# Patient Record
Sex: MALE | Race: WHITE
[De-identification: names, ages, dates, MRNs, and addresses within clinical notes are randomized per-mention and may not be internally consistent; named-entity substitution may affect disease eponyms.]

---

## 2020-03-04 ENCOUNTER — HOSPITAL ENCOUNTER (INPATIENT)
Dept: HOSPITAL 12 - ER | Age: 65
LOS: 13 days | Discharge: NURSING, REHAB OR LONG-TERM CARE | DRG: 885 | End: 2020-03-17
Payer: MEDICARE

## 2020-03-04 VITALS — HEIGHT: 69 IN | WEIGHT: 166 LBS | BODY MASS INDEX: 24.59 KG/M2

## 2020-03-04 DIAGNOSIS — G30.0: ICD-10-CM

## 2020-03-04 DIAGNOSIS — M62.81: ICD-10-CM

## 2020-03-04 DIAGNOSIS — F02.80: ICD-10-CM

## 2020-03-04 DIAGNOSIS — F41.9: ICD-10-CM

## 2020-03-04 DIAGNOSIS — R26.81: ICD-10-CM

## 2020-03-04 DIAGNOSIS — E78.5: ICD-10-CM

## 2020-03-04 DIAGNOSIS — D68.59: ICD-10-CM

## 2020-03-04 DIAGNOSIS — Z91.81: ICD-10-CM

## 2020-03-04 DIAGNOSIS — F39: Primary | ICD-10-CM

## 2020-03-04 DIAGNOSIS — Z74.09: ICD-10-CM

## 2020-03-04 LAB
BASOPHILS # BLD AUTO: 0 K/UL (ref 0–8)
BASOPHILS NFR BLD AUTO: 0.5 % (ref 0–2)
BUN SERPL-MCNC: 15 MG/DL (ref 7–18)
CHLORIDE SERPL-SCNC: 106 MMOL/L (ref 98–107)
CO2 SERPL-SCNC: 28 MMOL/L (ref 21–32)
CREAT SERPL-MCNC: 0.9 MG/DL (ref 0.6–1.3)
EOSINOPHIL # BLD AUTO: 0.1 K/UL (ref 0–0.7)
EOSINOPHIL NFR BLD AUTO: 0.8 % (ref 0–7)
GLUCOSE SERPL-MCNC: 95 MG/DL (ref 74–106)
HCT VFR BLD AUTO: 44.9 % (ref 36.7–47.1)
HGB BLD-MCNC: 15 G/DL (ref 12.5–16.3)
LYMPHOCYTES # BLD AUTO: 1.3 K/UL (ref 20–40)
LYMPHOCYTES NFR BLD AUTO: 17.7 % (ref 20.5–51.5)
MCH RBC QN AUTO: 31.3 UUG (ref 23.8–33.4)
MCHC RBC AUTO-ENTMCNC: 33 G/DL (ref 32.5–36.3)
MCV RBC AUTO: 93.6 FL (ref 73–96.2)
MONOCYTES # BLD AUTO: 0.6 K/UL (ref 2–10)
MONOCYTES NFR BLD AUTO: 8.5 % (ref 0–11)
NEUTROPHILS # BLD AUTO: 5.4 K/UL (ref 1.8–8.9)
NEUTROPHILS NFR BLD AUTO: 72.5 % (ref 38.5–71.5)
PLATELET # BLD AUTO: 234 K/UL (ref 152–348)
POTASSIUM SERPL-SCNC: 3.5 MMOL/L (ref 3.5–5.1)
RBC # BLD AUTO: 4.79 MIL/UL (ref 4.06–5.63)
TSH SERPL DL<=0.005 MIU/L-ACNC: 8.7 MIU/ML (ref 0.36–3.74)
WBC # BLD AUTO: 7.4 K/UL (ref 3.6–10.2)
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
## 2020-03-05 VITALS — DIASTOLIC BLOOD PRESSURE: 66 MMHG | SYSTOLIC BLOOD PRESSURE: 131 MMHG

## 2020-03-05 VITALS — SYSTOLIC BLOOD PRESSURE: 127 MMHG | DIASTOLIC BLOOD PRESSURE: 73 MMHG

## 2020-03-05 VITALS — DIASTOLIC BLOOD PRESSURE: 62 MMHG | SYSTOLIC BLOOD PRESSURE: 128 MMHG

## 2020-03-05 VITALS — SYSTOLIC BLOOD PRESSURE: 116 MMHG | DIASTOLIC BLOOD PRESSURE: 55 MMHG

## 2020-03-05 LAB
CHOLEST SERPL-MCNC: 188 MG/DL (ref ?–200)
HDLC SERPL-MCNC: 35 MG/DL (ref 40–60)
TRIGL SERPL-MCNC: 146 MG/DL (ref 30–150)

## 2020-03-05 RX ADMIN — ACETAMINOPHEN PRN MG: 325 TABLET ORAL at 14:41

## 2020-03-05 RX ADMIN — CLONAZEPAM PRN MG: 0.5 TABLET ORAL at 09:40

## 2020-03-05 RX ADMIN — CLONAZEPAM PRN MG: 0.5 TABLET ORAL at 20:35

## 2020-03-05 RX ADMIN — CLONAZEPAM PRN MG: 0.5 TABLET ORAL at 14:41

## 2020-03-05 RX ADMIN — DOCUSATE SODIUM - SENNOSIDES SCH TAB: 50; 8.6 TABLET, FILM COATED ORAL at 21:30

## 2020-03-05 NOTE — NUR
Social Work Coordination of Care:  spoke to wellness coordination Taina 
([phone]) who stated that she is unsure if they will accept patient, but will get back 
to this writer with an answer. Per Taina, she stated that Nancy the director will contact 
this writer.

## 2020-03-05 NOTE — NUR
Received patient asleep with 1:1 sitter for safety. Patient shows no signs or symptoms of 
distress at this time. No signs or pain. Will continue to monitor.

## 2020-03-05 NOTE — NUR
Received patient in stable condition accompanied by RN. Received report from HEBERT Hernandez. 
Patient is alert and oriented to his name only. Oriented to the mental health unit, today's 
date, and time. Patient admitted on a 5150 hold for GD for increased agitation and 
unmanageable behavior from board and care. Patient's belongings removed and placed in 
contraband locker.

## 2020-03-05 NOTE — NUR
coping skills assessed; pt unable to evaluate due to AMS

-------------------------------------------------------------------------------

Addendum: 03/05/20 at 0302 by GREG LINO RN

-------------------------------------------------------------------------------

Amended: Links added.

## 2020-03-05 NOTE — NUR
Social Work Initial Discharge Plan: [place] [address];[phone]. This writer spoke with Nancy ([phone]) 
who stated that she is unsure if they will accept patient, but will get back to this writer 
with an answer. Per patient's wife Justyn ([phone]) stated that she would want patient 
back there.  will work with the MD regarding appropriate discharge planning. 
 will form a safe and proper discharge.

## 2020-03-05 NOTE — NUR
Social Work Family Contact:  contacted patient's wife Justyn ([phone]) to 
gather collateral. This writer discussed treatment plan and discharge plan. PerJustyn she is 
the DPOA and will send this writer the documentation.

## 2020-03-06 VITALS — SYSTOLIC BLOOD PRESSURE: 116 MMHG | DIASTOLIC BLOOD PRESSURE: 74 MMHG

## 2020-03-06 VITALS — DIASTOLIC BLOOD PRESSURE: 60 MMHG | SYSTOLIC BLOOD PRESSURE: 105 MMHG

## 2020-03-06 VITALS — SYSTOLIC BLOOD PRESSURE: 116 MMHG | DIASTOLIC BLOOD PRESSURE: 51 MMHG

## 2020-03-06 RX ADMIN — DIVALPROEX SODIUM SCH MG: 125 CAPSULE ORAL at 13:02

## 2020-03-06 RX ADMIN — CLONAZEPAM PRN MG: 0.5 TABLET ORAL at 14:11

## 2020-03-06 RX ADMIN — DIVALPROEX SODIUM SCH MG: 250 TABLET, DELAYED RELEASE ORAL at 09:00

## 2020-03-06 RX ADMIN — CLONAZEPAM PRN MG: 0.5 TABLET ORAL at 20:04

## 2020-03-06 RX ADMIN — RIVASTIGMINE TARTRATE SCH MG: 1.5 CAPSULE ORAL at 20:04

## 2020-03-06 RX ADMIN — RIVASTIGMINE TARTRATE SCH MG: 1.5 CAPSULE ORAL at 09:10

## 2020-03-06 RX ADMIN — DIVALPROEX SODIUM SCH MG: 250 TABLET, DELAYED RELEASE ORAL at 09:10

## 2020-03-06 RX ADMIN — DOCUSATE SODIUM - SENNOSIDES SCH TAB: 50; 8.6 TABLET, FILM COATED ORAL at 20:06

## 2020-03-06 RX ADMIN — DIVALPROEX SODIUM SCH MG: 125 CAPSULE ORAL at 20:05

## 2020-03-06 NOTE — NUR
Social Work Individual Therapy:  met with patient for brief counseling to 
address patients aggressive and combative behavior. Patient is not cooperative with this 
writer and is unable to have a meaningful conversation. Patient presents to be confused and 
disorganized, he constantly states "where am I" or "where is my room". However, at this 
moment patient has been calm and cooperative.

## 2020-03-06 NOTE — NUR
Social Work Coordination of Care:  attempted to contact Nancy Director from 
[facility] ([phone]) and left a voicemail to return this writers call back.

## 2020-03-06 NOTE — NUR
Social Work Coordination of Care:  attempted to contact Nancy Director from 
[facility] ([phone]) stated that patient is welcomed back upon discharge.

## 2020-03-07 VITALS — DIASTOLIC BLOOD PRESSURE: 73 MMHG | SYSTOLIC BLOOD PRESSURE: 126 MMHG

## 2020-03-07 VITALS — DIASTOLIC BLOOD PRESSURE: 64 MMHG | SYSTOLIC BLOOD PRESSURE: 132 MMHG

## 2020-03-07 VITALS — DIASTOLIC BLOOD PRESSURE: 54 MMHG | SYSTOLIC BLOOD PRESSURE: 128 MMHG

## 2020-03-07 RX ADMIN — DOCUSATE SODIUM - SENNOSIDES SCH TAB: 50; 8.6 TABLET, FILM COATED ORAL at 20:55

## 2020-03-07 RX ADMIN — CLONAZEPAM PRN MG: 0.5 TABLET ORAL at 08:22

## 2020-03-07 RX ADMIN — DIVALPROEX SODIUM SCH MG: 125 CAPSULE ORAL at 08:22

## 2020-03-07 RX ADMIN — RIVASTIGMINE TARTRATE SCH MG: 1.5 CAPSULE ORAL at 20:55

## 2020-03-07 RX ADMIN — RIVASTIGMINE TARTRATE SCH MG: 1.5 CAPSULE ORAL at 08:21

## 2020-03-07 RX ADMIN — TEMAZEPAM PRN MG: 7.5 CAPSULE ORAL at 00:15

## 2020-03-07 RX ADMIN — TEMAZEPAM PRN MG: 7.5 CAPSULE ORAL at 22:23

## 2020-03-07 RX ADMIN — DIVALPROEX SODIUM SCH MG: 125 CAPSULE ORAL at 20:55

## 2020-03-07 NOTE — NUR
Received Pt sitting in lynsey chair in the hallway. Remains confused, disoriented, and 
disorganized and requires frequent redirection. Patient had some episodes of agitation and 
anxiety, Clonazepam 0.5mg given as ordered, minimally effective. Restoril 7.5mg administered 
for insomnia, which was only moderately effective. Pt awoke with sporadically restless and 
attempting to get out of bed despite safety instructions given by staff. Pt kept close to 
nursing station for direct observation. Denies pain, VS stable.

## 2020-03-07 NOTE — NUR
Received Pt sitting in lynsey chair in the hallway. Remains confused, disoriented, and 
disorganized and requires frequent redirection.patient enjoyed music group therapy, 
compliant with medication, patient calm and cooperative, visited by his wife

## 2020-03-08 VITALS — SYSTOLIC BLOOD PRESSURE: 127 MMHG | DIASTOLIC BLOOD PRESSURE: 86 MMHG

## 2020-03-08 VITALS — SYSTOLIC BLOOD PRESSURE: 114 MMHG | DIASTOLIC BLOOD PRESSURE: 69 MMHG

## 2020-03-08 VITALS — SYSTOLIC BLOOD PRESSURE: 131 MMHG | DIASTOLIC BLOOD PRESSURE: 63 MMHG

## 2020-03-08 RX ADMIN — DIVALPROEX SODIUM SCH MG: 125 CAPSULE ORAL at 08:15

## 2020-03-08 RX ADMIN — TEMAZEPAM PRN MG: 7.5 CAPSULE ORAL at 22:08

## 2020-03-08 RX ADMIN — RIVASTIGMINE TARTRATE SCH MG: 1.5 CAPSULE ORAL at 08:15

## 2020-03-08 RX ADMIN — DOCUSATE SODIUM - SENNOSIDES SCH TAB: 50; 8.6 TABLET, FILM COATED ORAL at 20:22

## 2020-03-08 RX ADMIN — RIVASTIGMINE TARTRATE SCH MG: 1.5 CAPSULE ORAL at 20:22

## 2020-03-08 RX ADMIN — DIVALPROEX SODIUM SCH MG: 125 CAPSULE ORAL at 20:22

## 2020-03-08 NOTE — NUR
GPS: Remains confused, disoriented, and disorganized and requires frequent redirection.  
compliant with medication, patient calm and cooperative, assisted with adl's.

## 2020-03-08 NOTE — NUR
PATIENT WIDE AWAKE UNABLE TO SLEEP, MEDICATED PATIENT WITH SLEEPING MEDS, AS ORDERED, CONT 
TO MONITOR.

## 2020-03-08 NOTE — NUR
PATIENT ALERT BUT CONFUSED AND DISORIENTED. PATIENT ABLE TO ANSWER SIMPLE QUESTIONS, NO 
COMPLAINS OF PAIN AT THIS TIME. PATIENT WITH RESTLESS RISK FOR FALL AND INJURY, PATIENT 
SEATED AT CHAIR CLOSED TO NURSING STATION FOR FREQUENT VISUAL CHECK.

## 2020-03-08 NOTE — NUR
Patient sitting in the hallway in chair, awake, alert and oriented to person only. Patient 
is laughing, speaking to himself and unknown others. When asked who patient is speaking to, 
he is unable to verbalize. patient provided with reality orientation. Will continue to 
monitor.

## 2020-03-08 NOTE — NUR
Received patient awake, alert and oriented to name only sitting in chair in dining room. 
Patient is disoriented and confused, requires redirection and reality orientation to time, 
date, and place. Patient is encouraged to verbalize his needs to staff, educated about 
impulse control.

## 2020-03-09 VITALS — SYSTOLIC BLOOD PRESSURE: 126 MMHG | DIASTOLIC BLOOD PRESSURE: 63 MMHG

## 2020-03-09 VITALS — DIASTOLIC BLOOD PRESSURE: 76 MMHG | SYSTOLIC BLOOD PRESSURE: 103 MMHG

## 2020-03-09 VITALS — SYSTOLIC BLOOD PRESSURE: 120 MMHG | DIASTOLIC BLOOD PRESSURE: 61 MMHG

## 2020-03-09 RX ADMIN — DIVALPROEX SODIUM SCH MG: 125 CAPSULE ORAL at 20:14

## 2020-03-09 RX ADMIN — RIVASTIGMINE TARTRATE SCH MG: 1.5 CAPSULE ORAL at 20:14

## 2020-03-09 RX ADMIN — DIVALPROEX SODIUM SCH MG: 125 CAPSULE ORAL at 08:00

## 2020-03-09 RX ADMIN — TEMAZEPAM PRN MG: 7.5 CAPSULE ORAL at 22:07

## 2020-03-09 RX ADMIN — RIVASTIGMINE TARTRATE SCH MG: 1.5 CAPSULE ORAL at 08:00

## 2020-03-09 RX ADMIN — DOCUSATE SODIUM - SENNOSIDES SCH TAB: 50; 8.6 TABLET, FILM COATED ORAL at 20:15
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
## 2020-03-09 NOTE — NUR
Received patient awake, alert and oriented to name only sitting in chair. Patient is 
disoriented and confused, requires redirection and reality orientation to time, date, and 
place. Patient is seen grabbing at the air to no visible stimuli, seen smiling and 
occasionally whispering to himself. Patient is unable to maintain linear and logical 
conversation. He is unable to verbalize his needs to staff or participate with assessment. 
Patient is able to follow staff direction. Patient is medication adherent, no adverse 
reaction noted. Patient requires assistance with feeding, ADL's, self care, and ambulation. 
Will continue to monitor.

## 2020-03-09 NOTE — NUR
PATIENT STILL AWAKE, PATIENT WAS PLACED BACK TO BED BUT CLIMBS OUT OF BED, RISK FOR FALL AND 
INJURY. PATIENT WAS KEPT CLEAN, DRY, OFFERED FOOD, AND TOILETING, BUT NOT EFFECTIVE PLACE 
BACK ON CHAIR, PLACE CLOSE TO NURSING STATION FOR VISUAL CHECK.

## 2020-03-09 NOTE — NUR
Patient noted with watery discharge in left eye. No redness, swelling, or colored discharge 
in eye. Patient denies pain, discomfort, change in vision, or burning in eyes. MD notified, 
orders given for Artificial tears for cleansing of eye. Orders noted and carried out.

## 2020-03-10 VITALS — DIASTOLIC BLOOD PRESSURE: 58 MMHG | SYSTOLIC BLOOD PRESSURE: 113 MMHG

## 2020-03-10 RX ADMIN — GENTAMICIN SULFATE SCH ML: 3 SOLUTION/ DROPS OPHTHALMIC at 20:05

## 2020-03-10 RX ADMIN — DIVALPROEX SODIUM SCH MG: 125 CAPSULE ORAL at 08:19

## 2020-03-10 RX ADMIN — RIVASTIGMINE TARTRATE SCH MG: 1.5 CAPSULE ORAL at 20:05

## 2020-03-10 RX ADMIN — RIVASTIGMINE TARTRATE SCH MG: 1.5 CAPSULE ORAL at 08:18

## 2020-03-10 RX ADMIN — TEMAZEPAM PRN MG: 7.5 CAPSULE ORAL at 21:52

## 2020-03-10 RX ADMIN — CLONAZEPAM PRN MG: 0.5 TABLET ORAL at 23:33

## 2020-03-10 RX ADMIN — DOCUSATE SODIUM - SENNOSIDES SCH TAB: 50; 8.6 TABLET, FILM COATED ORAL at 20:09

## 2020-03-10 RX ADMIN — DIVALPROEX SODIUM SCH MG: 125 CAPSULE ORAL at 20:05

## 2020-03-10 RX ADMIN — CLONAZEPAM PRN MG: 0.5 TABLET ORAL at 17:09

## 2020-03-10 NOTE — NUR
called and spoke with Dr. Mark regarding the discharges on both eyes , Orders made , read 
back orders and carried out, family made aware

## 2020-03-10 NOTE — NUR
Received patient awake, alert and oriented to person only sitting in chair. Patient requires 
frequent reality orientation and redirection for confusion and disorientation. Patient is 
unable to participate with interview with this writer, he is just able to nod his head "yes" 
or "no" to certain questions. Patient is seen RTIS, he is seen laughing to himself with no 
external stimuli. Patient requires assistance from staff with ADL's, self care, and feeding. 
Patient's safety maintained.

Patient's AM medications were administered by this writer: Depakote Sprinkle 250 mg PO, 
Exelon 1.5 mg PO with no adverse reaction. Patient tolerated medication well.

## 2020-03-10 NOTE — NUR
Social Work Individual Therapy:  met with patient for brief counseling to 
address patients aggressive and combative behavior. Patient has been calm and has been 
accepting care from the staff. However, patient continues to be disoriented and confused. 
Patient unable to have meaningful conversation with this writer. This writer will follow up 
with patient. .

## 2020-03-10 NOTE — NUR
Social Work PC Hearing Notification:  contacted patient's wife Justyn 
([phone]) and notified patients probable cause of hearing today through voicemail.

## 2020-03-10 NOTE — NUR
Social Work Coordination of Care:  was contacted by Eve Young 
([phone]) who stated that she will access the patient on 3/11/2020.

## 2020-03-11 VITALS — SYSTOLIC BLOOD PRESSURE: 134 MMHG | DIASTOLIC BLOOD PRESSURE: 77 MMHG

## 2020-03-11 VITALS — SYSTOLIC BLOOD PRESSURE: 123 MMHG | DIASTOLIC BLOOD PRESSURE: 59 MMHG

## 2020-03-11 VITALS — SYSTOLIC BLOOD PRESSURE: 110 MMHG | DIASTOLIC BLOOD PRESSURE: 46 MMHG

## 2020-03-11 RX ADMIN — RIVASTIGMINE TARTRATE SCH MG: 1.5 CAPSULE ORAL at 20:37

## 2020-03-11 RX ADMIN — DIVALPROEX SODIUM SCH MG: 125 CAPSULE ORAL at 20:38

## 2020-03-11 RX ADMIN — RIVASTIGMINE TARTRATE SCH MG: 1.5 CAPSULE ORAL at 08:45

## 2020-03-11 RX ADMIN — GENTAMICIN SULFATE SCH ML: 3 SOLUTION/ DROPS OPHTHALMIC at 08:44

## 2020-03-11 RX ADMIN — GENTAMICIN SULFATE SCH ML: 3 SOLUTION/ DROPS OPHTHALMIC at 18:06

## 2020-03-11 RX ADMIN — GENTAMICIN SULFATE SCH ML: 3 SOLUTION/ DROPS OPHTHALMIC at 13:05

## 2020-03-11 RX ADMIN — QUETIAPINE SCH MG: 25 TABLET, FILM COATED ORAL at 20:38

## 2020-03-11 RX ADMIN — GENTAMICIN SULFATE SCH ML: 3 SOLUTION/ DROPS OPHTHALMIC at 21:10

## 2020-03-11 RX ADMIN — DOCUSATE SODIUM - SENNOSIDES SCH TAB: 50; 8.6 TABLET, FILM COATED ORAL at 20:38

## 2020-03-11 RX ADMIN — DIVALPROEX SODIUM SCH MG: 125 CAPSULE ORAL at 08:45

## 2020-03-11 RX ADMIN — CLONAZEPAM PRN MG: 0.5 TABLET ORAL at 10:13

## 2020-03-11 RX ADMIN — ATORVASTATIN CALCIUM SCH MG: 10 TABLET, FILM COATED ORAL at 20:38

## 2020-03-11 NOTE — NUR
Social Work Family Contact:  spoke with patient's daughter Justyn ([phone]) 
who stated she is not sure with her father's placement. However, [facility] 
assessed patient and is accepting patient. This writer will search for nursing homes for 
patient.

## 2020-03-11 NOTE — NUR
Social Work Individual Therapy:  met with patient for brief counseling to 
address patients aggressive and combative behavior. Patient has not been combative with the 
staff. However, patient has been confused and is alert and oriented to self only. Patient is 
not able to engage in proper conservation with this writer. Patient is only able to nod his 
head to "yes" or "no" questions, but it seems that patient is not cognitively receptive to 
understand and doesn't seem to comprehend. This writer attempted to inform patient the 
importance to developing strategies when frustrated. Patient was not receptive.

## 2020-03-11 NOTE — NUR
received to care, up in lynsey chair for safety, appearing confused, talking to self, and 
disrobing. compliant with medications and bedtime snack, which had to be fed to him. he 
calmed down after taking his medications. as of 2200, he appears asleep, in bed. no distress 
noted. will continue to monitor closely.

## 2020-03-11 NOTE — NUR
Received Pt sitting in lynsey chair in the hallway. Remains confused, disoriented, and 
disorganized and requires frequent redirection. Pt had an episode of agitation and anxiety, 
presents with insomnia. Restoril 7.5mg given as ordered, minimally effective. Pt remained 
with restlessness and anxiety, Klonopin 0.5mg administered which was effective. ADLs 
provided. Denies pain, VS stable.

## 2020-03-12 VITALS — DIASTOLIC BLOOD PRESSURE: 70 MMHG | SYSTOLIC BLOOD PRESSURE: 125 MMHG

## 2020-03-12 VITALS — SYSTOLIC BLOOD PRESSURE: 115 MMHG | DIASTOLIC BLOOD PRESSURE: 69 MMHG

## 2020-03-12 RX ADMIN — GENTAMICIN SULFATE SCH ML: 3 SOLUTION/ DROPS OPHTHALMIC at 12:12

## 2020-03-12 RX ADMIN — RIVASTIGMINE TARTRATE SCH MG: 1.5 CAPSULE ORAL at 21:07

## 2020-03-12 RX ADMIN — ATORVASTATIN CALCIUM SCH MG: 10 TABLET, FILM COATED ORAL at 21:07

## 2020-03-12 RX ADMIN — CLONAZEPAM PRN MG: 0.5 TABLET ORAL at 16:07

## 2020-03-12 RX ADMIN — TEMAZEPAM PRN MG: 7.5 CAPSULE ORAL at 22:05

## 2020-03-12 RX ADMIN — QUETIAPINE SCH MG: 25 TABLET, FILM COATED ORAL at 08:29

## 2020-03-12 RX ADMIN — CLONAZEPAM PRN MG: 0.5 TABLET ORAL at 08:29

## 2020-03-12 RX ADMIN — ACETAMINOPHEN PRN MG: 325 TABLET ORAL at 22:05

## 2020-03-12 RX ADMIN — GENTAMICIN SULFATE SCH ML: 3 SOLUTION/ DROPS OPHTHALMIC at 08:29

## 2020-03-12 RX ADMIN — GENTAMICIN SULFATE SCH ML: 3 SOLUTION/ DROPS OPHTHALMIC at 21:30

## 2020-03-12 RX ADMIN — DIVALPROEX SODIUM SCH MG: 125 CAPSULE ORAL at 21:07

## 2020-03-12 RX ADMIN — GENTAMICIN SULFATE SCH ML: 3 SOLUTION/ DROPS OPHTHALMIC at 16:13

## 2020-03-12 RX ADMIN — DOCUSATE SODIUM - SENNOSIDES SCH TAB: 50; 8.6 TABLET, FILM COATED ORAL at 21:12

## 2020-03-12 RX ADMIN — RIVASTIGMINE TARTRATE SCH MG: 1.5 CAPSULE ORAL at 08:29

## 2020-03-12 RX ADMIN — DIVALPROEX SODIUM SCH MG: 125 CAPSULE ORAL at 08:29

## 2020-03-12 RX ADMIN — QUETIAPINE SCH MG: 25 TABLET, FILM COATED ORAL at 21:08

## 2020-03-12 NOTE — NUR
Social Work Family Contact:  spoke with wife Justyn ([phone]) and stated that 
patient is accepted to [facility]. Per Justyn, she agreed.

## 2020-03-12 NOTE — NUR
Social Work Coordination of Care:  faxed to Heidi veronica from [facility] 
([phone]). This writer sent patient's [facility] psychiatric notes and progress notes. Per 
Heidi, she will notify this writer.
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
## 2020-03-12 NOTE — NUR
Social Work Discharge Plan:  spoke with Isrrael ([phone]) and he stated 
that patient is accepted.

## 2020-03-12 NOTE — NUR
received to care, up in lynsey chair for safety, appearing confused, talking to self, and 
disrobing. compliant with medications and bedtime snack, which had to be fed to him. he 
calmed down after taking his medications. as of 2200, he remains awake. no distress noted. 
will continue to monitor closely.

## 2020-03-13 VITALS — SYSTOLIC BLOOD PRESSURE: 155 MMHG | DIASTOLIC BLOOD PRESSURE: 69 MMHG

## 2020-03-13 VITALS — DIASTOLIC BLOOD PRESSURE: 60 MMHG | SYSTOLIC BLOOD PRESSURE: 146 MMHG

## 2020-03-13 VITALS — SYSTOLIC BLOOD PRESSURE: 117 MMHG | DIASTOLIC BLOOD PRESSURE: 88 MMHG

## 2020-03-13 RX ADMIN — DOCUSATE SODIUM - SENNOSIDES SCH TAB: 50; 8.6 TABLET, FILM COATED ORAL at 21:25

## 2020-03-13 RX ADMIN — QUETIAPINE SCH MG: 25 TABLET, FILM COATED ORAL at 21:25

## 2020-03-13 RX ADMIN — GENTAMICIN SULFATE SCH ML: 3 SOLUTION/ DROPS OPHTHALMIC at 17:59

## 2020-03-13 RX ADMIN — ATORVASTATIN CALCIUM SCH MG: 10 TABLET, FILM COATED ORAL at 21:25

## 2020-03-13 RX ADMIN — RIVASTIGMINE TARTRATE SCH MG: 1.5 CAPSULE ORAL at 09:20

## 2020-03-13 RX ADMIN — TEMAZEPAM PRN MG: 7.5 CAPSULE ORAL at 22:54

## 2020-03-13 RX ADMIN — DIVALPROEX SODIUM SCH MG: 125 CAPSULE ORAL at 21:25

## 2020-03-13 RX ADMIN — DIVALPROEX SODIUM SCH MG: 125 CAPSULE ORAL at 09:21

## 2020-03-13 RX ADMIN — GENTAMICIN SULFATE SCH ML: 3 SOLUTION/ DROPS OPHTHALMIC at 13:11

## 2020-03-13 RX ADMIN — GENTAMICIN SULFATE SCH ML: 3 SOLUTION/ DROPS OPHTHALMIC at 09:21

## 2020-03-13 RX ADMIN — RIVASTIGMINE TARTRATE SCH MG: 1.5 CAPSULE ORAL at 21:24

## 2020-03-13 NOTE — NUR
Social Work Family Contact:  spoke with patient's wife Justyn ([phone]) and 
discussed patient's discharge plan. Per Justyn, she accepted for [facility].

## 2020-03-13 NOTE — NUR
met with patient for brief individual therapy regarding stress management. 
Patient presents combative and aggressive behavior. Patient presents with delusional thought 
content.  increased awareness surrounding positive reinforcement. Patient was 
unable to have a meaningful conversation with this writer. Patient was experiencing 
hallucinations and stated, come here and was grabbing air. Patient was responding to 
internal stimuli. 



*No group being held due to coronavirus precautions*

## 2020-03-14 VITALS — DIASTOLIC BLOOD PRESSURE: 69 MMHG | SYSTOLIC BLOOD PRESSURE: 117 MMHG

## 2020-03-14 VITALS — DIASTOLIC BLOOD PRESSURE: 60 MMHG | SYSTOLIC BLOOD PRESSURE: 133 MMHG

## 2020-03-14 VITALS — DIASTOLIC BLOOD PRESSURE: 78 MMHG | SYSTOLIC BLOOD PRESSURE: 127 MMHG

## 2020-03-14 RX ADMIN — CLONAZEPAM PRN MG: 0.5 TABLET ORAL at 19:55

## 2020-03-14 RX ADMIN — DOCUSATE SODIUM - SENNOSIDES SCH TAB: 50; 8.6 TABLET, FILM COATED ORAL at 20:03

## 2020-03-14 RX ADMIN — QUETIAPINE SCH MG: 25 TABLET, FILM COATED ORAL at 08:45

## 2020-03-14 RX ADMIN — DIVALPROEX SODIUM SCH MG: 125 CAPSULE ORAL at 08:48

## 2020-03-14 RX ADMIN — CLONAZEPAM PRN MG: 0.5 TABLET ORAL at 00:02

## 2020-03-14 RX ADMIN — RIVASTIGMINE TARTRATE SCH MG: 1.5 CAPSULE ORAL at 08:45

## 2020-03-14 RX ADMIN — ATORVASTATIN CALCIUM SCH MG: 10 TABLET, FILM COATED ORAL at 20:01

## 2020-03-14 RX ADMIN — QUETIAPINE SCH MG: 25 TABLET, FILM COATED ORAL at 20:02

## 2020-03-14 RX ADMIN — RIVASTIGMINE TARTRATE SCH MG: 1.5 CAPSULE ORAL at 20:01

## 2020-03-14 RX ADMIN — DIVALPROEX SODIUM SCH MG: 125 CAPSULE ORAL at 20:01

## 2020-03-14 NOTE — NUR
pt is now awake. assisted up in lynsey chair, for safety. currently at nurses station. remains 
calm. no distress noted.

## 2020-03-14 NOTE — NUR
received to care, up in lynsey chair for safety, appearing confused, talking to self, and 
disrobing. compliant with medications and bedtime snack, which had to be fed to him. he 
calmed down after taking his medications. as of 2200, he remains awake. no distress noted. 
will continue to monitor closely.

-------------------------------------------------------------------------------

Addendum: 03/14/20 at 0126 by ANGEL CANDELARIA LVN

-------------------------------------------------------------------------------

error wrong time documented.

## 2020-03-15 VITALS — SYSTOLIC BLOOD PRESSURE: 103 MMHG | DIASTOLIC BLOOD PRESSURE: 53 MMHG

## 2020-03-15 VITALS — DIASTOLIC BLOOD PRESSURE: 55 MMHG | SYSTOLIC BLOOD PRESSURE: 111 MMHG

## 2020-03-15 VITALS — DIASTOLIC BLOOD PRESSURE: 63 MMHG | SYSTOLIC BLOOD PRESSURE: 119 MMHG

## 2020-03-15 VITALS — SYSTOLIC BLOOD PRESSURE: 129 MMHG | DIASTOLIC BLOOD PRESSURE: 71 MMHG

## 2020-03-15 RX ADMIN — DIVALPROEX SODIUM SCH MG: 125 CAPSULE ORAL at 20:04

## 2020-03-15 RX ADMIN — CLONAZEPAM PRN MG: 0.5 TABLET ORAL at 08:32

## 2020-03-15 RX ADMIN — CLONAZEPAM PRN MG: 0.5 TABLET ORAL at 16:39

## 2020-03-15 RX ADMIN — CLONAZEPAM PRN MG: 0.5 TABLET ORAL at 23:35

## 2020-03-15 RX ADMIN — DIVALPROEX SODIUM SCH MG: 125 CAPSULE ORAL at 08:32

## 2020-03-15 RX ADMIN — QUETIAPINE SCH MG: 25 TABLET, FILM COATED ORAL at 20:04

## 2020-03-15 RX ADMIN — TEMAZEPAM PRN MG: 7.5 CAPSULE ORAL at 22:04

## 2020-03-15 RX ADMIN — RIVASTIGMINE TARTRATE SCH MG: 1.5 CAPSULE ORAL at 08:32

## 2020-03-15 RX ADMIN — RIVASTIGMINE TARTRATE SCH MG: 1.5 CAPSULE ORAL at 20:04

## 2020-03-15 RX ADMIN — ATORVASTATIN CALCIUM SCH MG: 10 TABLET, FILM COATED ORAL at 20:04

## 2020-03-15 RX ADMIN — DOCUSATE SODIUM - SENNOSIDES SCH TAB: 50; 8.6 TABLET, FILM COATED ORAL at 20:04

## 2020-03-15 RX ADMIN — QUETIAPINE SCH MG: 25 TABLET, FILM COATED ORAL at 08:32

## 2020-03-15 NOTE — NUR
Remain confused but cooperative with meds and care. slept 4.5 hours total. up in lynsey chair 
near the nursing station for safety. no distress noted.

## 2020-03-15 NOTE — NUR
Pt received resting in bed, assisted to Dione-chair, appears anxious pulling at clothes and 
banging fist on furniture. Pt confused but compliant with medication administration, taking 
pills crushed with food. Klonopin administered for anxiety as ordered PRN. All safety and 
comfort needs attended to, will continue to monitor for safety.

## 2020-03-16 VITALS — DIASTOLIC BLOOD PRESSURE: 68 MMHG | SYSTOLIC BLOOD PRESSURE: 139 MMHG

## 2020-03-16 VITALS — SYSTOLIC BLOOD PRESSURE: 123 MMHG | DIASTOLIC BLOOD PRESSURE: 68 MMHG

## 2020-03-16 VITALS — SYSTOLIC BLOOD PRESSURE: 127 MMHG | DIASTOLIC BLOOD PRESSURE: 66 MMHG

## 2020-03-16 RX ADMIN — DIVALPROEX SODIUM SCH MG: 125 CAPSULE ORAL at 09:31

## 2020-03-16 RX ADMIN — QUETIAPINE SCH MG: 25 TABLET, FILM COATED ORAL at 09:31

## 2020-03-16 RX ADMIN — RIVASTIGMINE TARTRATE SCH MG: 1.5 CAPSULE ORAL at 09:31

## 2020-03-16 RX ADMIN — TEMAZEPAM PRN MG: 7.5 CAPSULE ORAL at 22:46

## 2020-03-16 RX ADMIN — QUETIAPINE SCH MG: 25 TABLET, FILM COATED ORAL at 21:24

## 2020-03-16 RX ADMIN — DOCUSATE SODIUM - SENNOSIDES SCH TAB: 50; 8.6 TABLET, FILM COATED ORAL at 21:24

## 2020-03-16 RX ADMIN — ATORVASTATIN CALCIUM SCH MG: 10 TABLET, FILM COATED ORAL at 21:24

## 2020-03-16 RX ADMIN — RIVASTIGMINE TARTRATE SCH MG: 1.5 CAPSULE ORAL at 21:24

## 2020-03-16 RX ADMIN — DIVALPROEX SODIUM SCH MG: 125 CAPSULE ORAL at 21:24

## 2020-03-16 RX ADMIN — CLONAZEPAM PRN MG: 0.5 TABLET ORAL at 20:22

## 2020-03-16 RX ADMIN — ACETAMINOPHEN PRN MG: 325 TABLET ORAL at 22:46

## 2020-03-16 NOTE — NUR
Social Work Firearms Report:  completed and submitted a [employer] firearms report for 
5250 grave disability certification. A copy of report has been placed in patient chart.

## 2020-03-16 NOTE — NUR
GPS:  Remains anxious and restless. Frequently trying to get oob unassisted. Has poor safety 
awareness. Fall precautions observed. Sleeping pill given 2 hrs.ago ineffective. Klonopin 
0.5mg given PO for anxiety. Re-directed and re-assured frequently. Quiet environment 
provided to facilitate sleep.

## 2020-03-16 NOTE — NUR
Social Work Family Contact:  contacted patient's wife Justyn ([phone]) and 
she was agreeable with [facility].

## 2020-03-17 VITALS — SYSTOLIC BLOOD PRESSURE: 129 MMHG | DIASTOLIC BLOOD PRESSURE: 55 MMHG

## 2020-03-17 RX ADMIN — DIVALPROEX SODIUM SCH MG: 125 CAPSULE ORAL at 09:30

## 2020-03-17 RX ADMIN — QUETIAPINE SCH MG: 25 TABLET, FILM COATED ORAL at 09:30

## 2020-03-17 RX ADMIN — RIVASTIGMINE TARTRATE SCH MG: 1.5 CAPSULE ORAL at 09:30

## 2020-03-17 NOTE — NUR
received to care, last night, up in lynsey chair for safety, appearing confused, talking to 
self, but compliant with medications and bedtime snack. PRN klonopin was given at 2022, for 
restlessness, which was minimally effective. as of 2200, he remains awake. no distress 
noted. will continue to monitor closely.

## 2020-03-17 NOTE — NUR
PRN restoril given at 2246 for insomnia. slept 5 yours total. continues to sleep. no 
distress noted.

## 2020-03-17 NOTE — NUR
Social Work Discharge Note: Patient will be discharged to skilled nursing facility, [facility] [address] [place] [address]; Phone: [phone]) via Ambulance 
transportation at 12PM.  [facility] at [facility]; ([phone]), who stated patient will be accepted back at facility today. Patient 
spoke with patients wife Gal ([phone]) who was agreeable with patients plan of 
discharge. Patient is alert and oriented x1-2 and is unable to plan for self-care. Patient 
denies any suicidal or homicidal ideations. Patient is aware and agreeable with discharge 
plans. Patient will continue to follow-up with Psychiatrist Dr. Pulido and Internist Dr. Dover at [facility] [id number] [place] [address]; Phone: [phone]. 
Patient presents with euthymic mood and congruent affect.

## 2020-03-17 NOTE — NUR
GPS: Nursing Notes: Discharge Notes:

Patient awake and responding to his name, compliant with his medications, needs assistance 
with ADL's, ambulatory with assistance, denies any SI/HI, denies any AH/VH, denies any pain 
or discomfort, denies any SOB, discharge to [facility] [address] [place] 
[address]; Phone: [phone]. Dr. Pulido (psychiatrist) and Dr. Dover (internist) will 
follow up at the facility for aftercare, report given to Anne-Marie RN supervisor, transported via 
ambulance to facility. Patient's wife - Justyn [phone] was notify of discharge by social 
worker.

## 2020-09-27 ENCOUNTER — HOSPITAL ENCOUNTER (INPATIENT)
Dept: HOSPITAL 54 - ER | Age: 65
LOS: 3 days | Discharge: NURSING, REHAB OR LONG-TERM CARE | DRG: 640 | End: 2020-09-30
Attending: INTERNAL MEDICINE | Admitting: INTERNAL MEDICINE
Payer: MEDICARE

## 2020-09-27 VITALS — BODY MASS INDEX: 22.68 KG/M2 | HEIGHT: 71 IN | WEIGHT: 162 LBS

## 2020-09-27 VITALS — DIASTOLIC BLOOD PRESSURE: 79 MMHG | SYSTOLIC BLOOD PRESSURE: 153 MMHG

## 2020-09-27 DIAGNOSIS — F20.9: ICD-10-CM

## 2020-09-27 DIAGNOSIS — G62.9: ICD-10-CM

## 2020-09-27 DIAGNOSIS — F41.9: ICD-10-CM

## 2020-09-27 DIAGNOSIS — L30.9: ICD-10-CM

## 2020-09-27 DIAGNOSIS — N17.0: ICD-10-CM

## 2020-09-27 DIAGNOSIS — G92: ICD-10-CM

## 2020-09-27 DIAGNOSIS — E86.0: ICD-10-CM

## 2020-09-27 DIAGNOSIS — R62.7: Primary | ICD-10-CM

## 2020-09-27 DIAGNOSIS — E78.5: ICD-10-CM

## 2020-09-27 DIAGNOSIS — F31.9: ICD-10-CM

## 2020-09-27 LAB
ALBUMIN SERPL BCP-MCNC: 3.9 G/DL (ref 3.4–5)
ALP SERPL-CCNC: 117 U/L (ref 46–116)
ALT SERPL W P-5'-P-CCNC: 17 U/L (ref 12–78)
AST SERPL W P-5'-P-CCNC: 16 U/L (ref 15–37)
BASOPHILS # BLD AUTO: 0.1 /CMM (ref 0–0.2)
BASOPHILS NFR BLD AUTO: 0.7 % (ref 0–2)
BILIRUB DIRECT SERPL-MCNC: 0.1 MG/DL (ref 0–0.2)
BILIRUB SERPL-MCNC: 0.2 MG/DL (ref 0.2–1)
BUN SERPL-MCNC: 21 MG/DL (ref 7–18)
CALCIUM SERPL-MCNC: 9.3 MG/DL (ref 8.5–10.1)
CHLORIDE SERPL-SCNC: 102 MMOL/L (ref 98–107)
CO2 SERPL-SCNC: 26 MMOL/L (ref 21–32)
CREAT SERPL-MCNC: 0.9 MG/DL (ref 0.6–1.3)
EOSINOPHIL NFR BLD AUTO: 2.8 % (ref 0–6)
GLUCOSE SERPL-MCNC: 88 MG/DL (ref 74–106)
HCT VFR BLD AUTO: 44 % (ref 39–51)
HGB BLD-MCNC: 14.2 G/DL (ref 13.5–17.5)
LYMPHOCYTES NFR BLD AUTO: 1.5 /CMM (ref 0.8–4.8)
LYMPHOCYTES NFR BLD AUTO: 18.4 % (ref 20–44)
MCHC RBC AUTO-ENTMCNC: 33 G/DL (ref 31–36)
MCV RBC AUTO: 92 FL (ref 80–96)
MONOCYTES NFR BLD AUTO: 0.8 /CMM (ref 0.1–1.3)
MONOCYTES NFR BLD AUTO: 9.6 % (ref 2–12)
NEUTROPHILS # BLD AUTO: 5.7 /CMM (ref 1.8–8.9)
NEUTROPHILS NFR BLD AUTO: 68.5 % (ref 43–81)
PH UR STRIP: 5.5 [PH] (ref 5–8)
PLATELET # BLD AUTO: 309 /CMM (ref 150–450)
POTASSIUM SERPL-SCNC: 4.1 MMOL/L (ref 3.5–5.1)
PROT SERPL-MCNC: 7.5 G/DL (ref 6.4–8.2)
RBC # BLD AUTO: 4.74 MIL/UL (ref 4.5–6)
RBC #/AREA URNS HPF: (no result) /HPF (ref 0–2)
SODIUM SERPL-SCNC: 139 MMOL/L (ref 136–145)
UROBILINOGEN UR STRIP-MCNC: 0.2 EU/DL
WBC #/AREA URNS HPF: (no result) /HPF
WBC #/AREA URNS HPF: (no result) /HPF (ref 0–3)
WBC NRBC COR # BLD AUTO: 8.4 K/UL (ref 4.3–11)

## 2020-09-27 PROCEDURE — G0378 HOSPITAL OBSERVATION PER HR: HCPCS

## 2020-09-27 RX ADMIN — DONEPEZIL HYDROCHLORIDE SCH MG: 5 TABLET ORAL at 21:50

## 2020-09-27 RX ADMIN — ATORVASTATIN CALCIUM SCH MG: 10 TABLET, FILM COATED ORAL at 21:50

## 2020-09-27 RX ADMIN — DIVALPROEX SODIUM SCH MG: 250 TABLET, DELAYED RELEASE ORAL at 21:50

## 2020-09-27 RX ADMIN — QUETIAPINE SCH MG: 25 TABLET, FILM COATED ORAL at 21:50

## 2020-09-27 NOTE — NUR
MS RN ADMISSION NOTES

PATIENT ADMITTED FROM ER IN STABLE CONDITION. A/OX1, CONFUSED. STABLE ON RA; NO S/S OF ACUTE 
RESPIRATORY DISTRESS; BREATHING IS EVEN AND UNLABORED. NO S/S OF PAIN NOTED. IV PRESENT ON 
RIGHT FA, SIZE 18, INTACT & PATENT, HEP LOCKED, WRAPPED IN KERLIX. GENERALIZED RASH/SCABS 
PRESENT UPON SKIN ASSESSMENT; PHOTOS TAKEN AND PLACED IN CHART. NO BELONGINGS NOTED WITH 
PATIENT. ADMITTING ORDERS RECEIVED FROM . SAFETY MEASURES IN PLACE AND 
PATIENT'S NEEDS MET. BED LOCKED, ALARM ON, HOB ELEVATED, SIDE RAILS X3, CALL LIGHT WITHIN 
REACH. WILL CONTINUE TO MONITOR. 

-------------------------------------------------------------------------------

Addendum: 09/28/20 at 0058 by ADAM LOYOLA RN

-------------------------------------------------------------------------------

PATIENT'S CLOTHING LISTED ON BELONGINGS LIST; BELONGINGS KEPT AT THE BEDSIDE.

## 2020-09-27 NOTE — NUR
BJORN, FROM [facility] SENT BY PMD FOR FURTHER EVAL D/T GENERALIZED WEAKNESS 
AND POOR PO INTAKE x 1 DAY. PER REPORT PATIENT DID NOT EAT BREAKFAST AND LESS 
THAN 30% OF LUNCH FOR TODAY. PATIENT AAO x 1, TO ER BED 3, HOOKED TO MONITOR, 
CHANGED TO HOSP GOWN, WARM BLANKET PROVIDED, BREATHING EVEN AND UNLABORED, 
AWAITING MD HIGH.

## 2020-09-28 VITALS — SYSTOLIC BLOOD PRESSURE: 149 MMHG | DIASTOLIC BLOOD PRESSURE: 65 MMHG

## 2020-09-28 VITALS — SYSTOLIC BLOOD PRESSURE: 109 MMHG | DIASTOLIC BLOOD PRESSURE: 79 MMHG

## 2020-09-28 VITALS — DIASTOLIC BLOOD PRESSURE: 63 MMHG | SYSTOLIC BLOOD PRESSURE: 158 MMHG

## 2020-09-28 LAB
BASOPHILS # BLD AUTO: 0 /CMM (ref 0–0.2)
BASOPHILS NFR BLD AUTO: 0.4 % (ref 0–2)
BUN SERPL-MCNC: 13 MG/DL (ref 7–18)
CALCIUM SERPL-MCNC: 8.9 MG/DL (ref 8.5–10.1)
CHLORIDE SERPL-SCNC: 104 MMOL/L (ref 98–107)
CO2 SERPL-SCNC: 28 MMOL/L (ref 21–32)
CREAT SERPL-MCNC: 0.9 MG/DL (ref 0.6–1.3)
EOSINOPHIL NFR BLD AUTO: 4.5 % (ref 0–6)
GLUCOSE SERPL-MCNC: 86 MG/DL (ref 74–106)
HCT VFR BLD AUTO: 46 % (ref 39–51)
HGB BLD-MCNC: 15.4 G/DL (ref 13.5–17.5)
LYMPHOCYTES NFR BLD AUTO: 1.3 /CMM (ref 0.8–4.8)
LYMPHOCYTES NFR BLD AUTO: 16.7 % (ref 20–44)
MAGNESIUM SERPL-MCNC: 2.5 MG/DL (ref 1.8–2.4)
MCHC RBC AUTO-ENTMCNC: 34 G/DL (ref 31–36)
MCV RBC AUTO: 92 FL (ref 80–96)
MONOCYTES NFR BLD AUTO: 0.7 /CMM (ref 0.1–1.3)
MONOCYTES NFR BLD AUTO: 8.8 % (ref 2–12)
NEUTROPHILS # BLD AUTO: 5.3 /CMM (ref 1.8–8.9)
NEUTROPHILS NFR BLD AUTO: 69.6 % (ref 43–81)
PHOSPHATE SERPL-MCNC: 3.2 MG/DL (ref 2.5–4.9)
PLATELET # BLD AUTO: 274 /CMM (ref 150–450)
POTASSIUM SERPL-SCNC: 3.7 MMOL/L (ref 3.5–5.1)
RBC # BLD AUTO: 5.04 MIL/UL (ref 4.5–6)
SODIUM SERPL-SCNC: 140 MMOL/L (ref 136–145)
WBC NRBC COR # BLD AUTO: 7.6 K/UL (ref 4.3–11)

## 2020-09-28 RX ADMIN — QUETIAPINE SCH MG: 25 TABLET, FILM COATED ORAL at 21:44

## 2020-09-28 RX ADMIN — SODIUM CHLORIDE PRN MLS/HR: 9 INJECTION, SOLUTION INTRAVENOUS at 00:01

## 2020-09-28 RX ADMIN — DIVALPROEX SODIUM SCH MG: 250 TABLET, DELAYED RELEASE ORAL at 09:17

## 2020-09-28 RX ADMIN — DIVALPROEX SODIUM SCH MG: 250 TABLET, DELAYED RELEASE ORAL at 21:43

## 2020-09-28 RX ADMIN — ATORVASTATIN CALCIUM SCH MG: 10 TABLET, FILM COATED ORAL at 21:43

## 2020-09-28 RX ADMIN — SODIUM CHLORIDE PRN MLS/HR: 9 INJECTION, SOLUTION INTRAVENOUS at 17:48

## 2020-09-28 RX ADMIN — QUETIAPINE SCH MG: 25 TABLET, FILM COATED ORAL at 09:17

## 2020-09-28 RX ADMIN — DONEPEZIL HYDROCHLORIDE SCH MG: 5 TABLET ORAL at 21:43

## 2020-09-28 NOTE — NUR
MS RN CLOSING NOTES

PATIENT SLEEPING, EASY TO AWAKEN. A/OX1. STABLE ON RA. NO S/S OF ACUTE RESPIRATORY DISTRESS; 
BREATHING IS EVEN AND UNLABORED. NO S/S OF PAIN NOTED. IV PRESENT ON RIGHT FA, SIZE 18, 
INTACT & PATENT WITH NS RUNNING AT 75 ML/HR. SAFETY MEASURES IN PLACE AND PATIENT'S NEEDS 
MET. BED LOCKED, ALARM ON, SIDE RAILS X3, CALL LIGHT WITHIN REACH. ENDORSED TO DAY SHIFT RN 
PLAN OF CARE.

## 2020-09-28 NOTE — NUR
MS HEBERT OPEN NOTES



PATIENT IS AWAKE, WITH NO SIGNS OF DISTRESS IN ROOM AIR. IV R FA #18G RUNNING NS AT 75ML/HR. 
WITH NO COMPLAIN OF PAIN AT THIS MOMENT. CONTACT PRECAUTION. SAFETY MEASURES ARE APPLIED, 
BED IS IN LOW AND LOCKED POSITION, SIDE RAILS UP X 2. CALL LIGHT WITHIN REACH. WILL CONTINUE 
TO MONITOR.

## 2020-09-28 NOTE — NUR
MS RN CLOSE NOTES



PATIENT IS AWAKE, WITH NO SIGNS OF DISTRESS IN ROOM AIR. IV R FA #18G RUNNING NS AT 75ML/HR. 
WITH NO COMPLAIN OF PAIN AT THIS MOMENT. CONTACT PRECAUTION. PATIENT REMAINED STABLE THROUGH 
OUT SHIFT. PATIENT KEPT CLEAN AND DRY. ALL NEEDS, CARE, TREATMENT AND MEDICATIONS 
ADMINISTERED AS ANTICIPATED PER ORDER. SAFETY MEASURES ARE APPLIED, BED IS LOW AND LOCKED. 
SIDE RAILS UP X 2 FOR SAFETY, CALL LIGHT WITHIN REACH. WILL ENDORSE TO THE NEXT NIGHT SHIFT.

## 2020-09-28 NOTE — NUR
CRITICAL LAB BLOOD CULTURE POSITIVE INFORMED DR. PEREZ, NO NEW ORDER PER DR POSSIBLE 
TEST CONTAMINATION AND  NO SIGNS OF INFECTIONS. WILL CONTINUE TO MONITOR.

## 2020-09-29 VITALS — DIASTOLIC BLOOD PRESSURE: 57 MMHG | SYSTOLIC BLOOD PRESSURE: 136 MMHG

## 2020-09-29 VITALS — SYSTOLIC BLOOD PRESSURE: 142 MMHG | DIASTOLIC BLOOD PRESSURE: 67 MMHG

## 2020-09-29 VITALS — SYSTOLIC BLOOD PRESSURE: 149 MMHG | DIASTOLIC BLOOD PRESSURE: 77 MMHG

## 2020-09-29 RX ADMIN — DIVALPROEX SODIUM SCH MG: 250 TABLET, DELAYED RELEASE ORAL at 21:18

## 2020-09-29 RX ADMIN — QUETIAPINE SCH MG: 25 TABLET, FILM COATED ORAL at 21:18

## 2020-09-29 RX ADMIN — DIVALPROEX SODIUM SCH MG: 250 TABLET, DELAYED RELEASE ORAL at 08:49

## 2020-09-29 RX ADMIN — ATORVASTATIN CALCIUM SCH MG: 10 TABLET, FILM COATED ORAL at 21:18

## 2020-09-29 RX ADMIN — SODIUM CHLORIDE PRN MLS/HR: 9 INJECTION, SOLUTION INTRAVENOUS at 06:34

## 2020-09-29 RX ADMIN — QUETIAPINE SCH MG: 25 TABLET, FILM COATED ORAL at 08:49

## 2020-09-29 RX ADMIN — DONEPEZIL HYDROCHLORIDE SCH MG: 5 TABLET ORAL at 21:18

## 2020-09-29 NOTE — NUR
MS RN OPEN SHIFT NOTES



RECEIVED PATIENT RESTING IN BED,  ON RA WITH NO SIGNS OF ANY DISTRESS, IV RFA #18G RUNNING 
NS AT 75ML/HR. WITH NO COMPLAIN OF PAIN AT THIS MOMENT. CONTACT PRECAUTION D/T RASH TO LOWER 
EXTREMITY, BED IS AT LOWEST POSITION AND LOCKED SIDE RAILS UP X 2. CALL LIGHT WITHIN REACH. 
WILL CONTINUE TO MONITOR.

## 2020-09-29 NOTE — NUR
WOUND CARE CONSULT: PT PRESENTS WITH RASH AND MULTIPLE TINY SCABS ON BODY, PRESENT ON 
ADMISSION. CHARGE RN DISCUSSED WITH MD. RECOMMENDATIONS MADE FOR SKIN PROTECTION. DISCUSSED 
WITH NURSING STAFF. WILL SEE PRN. MD IN AGREEMENT WITH PLAN OF CARE.

## 2020-09-29 NOTE — NUR
MS RN OPENING NOTES



PATIENT RECEIVED RESTING IN BED COMFORTABLY; A/OX1, CONFUSED; BREATHING EVEN AND UNLABORED; 
TOLERATING ROOM AIR WELL; NO SOB NOTED AT THIS TIME; ISOLATION PRECAUTIONS MAINTAINED; PER 
AM SHIFT, NEEDS SCRAPPING BUT NO SCRAPE KIT AVAILABLE; WILL INFORM DAY SHIFT; R FA #18 
INTACT AND PATENT, FLUSHING WELL; INFUSING NS @ 75ML/HR, TOLERATING WELL; SAFETY PRECAUTIONS 
IMPLEMENTED; BED LOCKED IN LOW POSITION; SIDE RAILSX2; WILL CONTINUE PLAN OF CARE AND 
CONTINUE TO MONITOR

## 2020-09-29 NOTE — NUR
MS RN END OF SHIFT CLOSING NOTE





PATIENT RESTING COMFORTABLY IN BED, ON RA WITH NO SIGNS OF DISTRESS. IV TO RT ARM #18G. NO 
COMPLAIN OF PAIN AT THIS MOMENT. ON CONTACT ISOLATION PRECAUTIONS D/T RASH POSS SCABIES, 
PENDING SCRAPE.  MEDICATIONS ADMINISTERED AS ANTICIPATED PER ORDER. SAFETY MEASURES APPLIED, 
BED IS LOW POSITION AND LOCKED WITH SIDE RAILS UP X 2 FOR SAFETY, CALL LIGHT WITH IN REACH.

## 2020-09-30 VITALS — DIASTOLIC BLOOD PRESSURE: 74 MMHG | SYSTOLIC BLOOD PRESSURE: 151 MMHG

## 2020-09-30 RX ADMIN — QUETIAPINE SCH MG: 25 TABLET, FILM COATED ORAL at 09:15

## 2020-09-30 RX ADMIN — DIVALPROEX SODIUM SCH MG: 250 TABLET, DELAYED RELEASE ORAL at 09:15

## 2020-09-30 RX ADMIN — SODIUM CHLORIDE PRN MLS/HR: 9 INJECTION, SOLUTION INTRAVENOUS at 05:45

## 2020-09-30 NOTE — NUR
MS RN CLOSING NOTES 



PATEINT RESTING IN BED COMFORTABLY; A/OX1, BREATHING EVEN AND UNLABORED; TOLERATING ROOM AIR 
WELL; NO SOB NOTED; NO DISTRESS NOTED; ISOLATION PRECAUTIONS MAINTAINED; R FA # 18 INTACT 
AND PATENT; STILL AWAITING SCRAPE KIT; WILL INFORM DAY SHIFT; ALL NEEDS RENDERED; SAFETY 
PRECAUTIONS IMPLEMENTED, BED LOCKED IN LOW POSITION; SIDE RAILSX2; WILL ENDORSE IGOR TO 
ONCOMING SHIFT

## 2020-09-30 NOTE — NUR
MS RN OPEN SHIFT NOTES



RECEIVED PATIENT RESTING IN BED COMFORTABLY, A/OX1, ON RA WITH NO SIGNS OF ANY ACUTE 
DISTRESS, IV TO RFA #18G RUNNING NS AT 75ML/HR. NO C/O PAIN AT THIS MOMENT. CONTACT 
PRECAUTION IN PLACE D/T RASH TO LOWER EXTREMITY, SCRAPE TEST PENDING, BED IS AT LOWEST 
POSITION AND LOCKED, SIDE RAILS UP X 2. CALL LIGHT WITHIN REACH. WILL CONTINUE TO MONITOR.

## 2020-09-30 NOTE — NUR
MS RN DISCHARGE NOTES



DISCHARGE ORDER RECEIVED, PATIENT INFORMED OF DISCHARGE TO SNF [facility]. PATIENT 
ACKNOWLEDGE AND STATED ONLY 'UM-HUM". PT IS CONFUSED AND ALERT AND ORIENTED X1. PT IS IN 
STABLE CONDITION. ALL VITALS WNL AND STABLE. UNABLE TO DISCUSS DISCHARGE INSTRUCTIONS AND 
EDUCATION VERBALLY WITH PATIENT. PROVIDED PT /SNF WITH ALL MEDICATION, DISCHARGE INSTRUCTION 
AND EDUCATION IN WRITTEN. BELONGINGS CHECKED, SKIN CHECKED AND DISCHARGE PHOTOS TAKEN. SNF 
NOTIFIED OF PATIENT DISCHARGE, REPORT GIVEN TO JOSE HURST AT [facility] [phone]. 
PATIENT IN ROOM RESTING COMFORTABLY IN BED AT LOWEST POSITION, CALL LIGHT WITH IN REACH 
AWAITING  BY AMBULANCE BY 2 PM.

## 2020-09-30 NOTE — NUR
RN MS NOTES

PT FOR D/C TO SNF, PER DR. PEREZ, MAY D/C CONTACT ISOLATION PRECAUTIONS AND SKIN SCRAPE 
ORDER.

## 2020-09-30 NOTE — NUR
MS RN DISCHARGE NOTE



PATIENT PICKED UP BY 2 EMT TRANSPORTED VIA AMBULANCE TO [facility]. REPORT GIVEN TO 
AMBULANCE PERSONNEL. IV TO RT WRIST AREA D/C. DISCHARGE DOCUMENTS AND FACESHEET GIVEN TO 
EMT. BELONGINGS WITH PATIENT. ALL VITALS WNL. PATIENT DISCHARGED IN STABLE CONDITION.

## 2020-10-27 ENCOUNTER — HOSPITAL ENCOUNTER (INPATIENT)
Dept: HOSPITAL 54 - ER | Age: 65
LOS: 7 days | Discharge: NURSING, REHAB OR LONG-TERM CARE | DRG: 177 | End: 2020-11-03
Attending: INTERNAL MEDICINE | Admitting: INTERNAL MEDICINE
Payer: MEDICARE

## 2020-10-27 VITALS — HEIGHT: 71 IN | BODY MASS INDEX: 21.84 KG/M2 | WEIGHT: 156 LBS

## 2020-10-27 DIAGNOSIS — J15.9: ICD-10-CM

## 2020-10-27 DIAGNOSIS — U07.1: Primary | ICD-10-CM

## 2020-10-27 DIAGNOSIS — J12.89: ICD-10-CM

## 2020-10-27 DIAGNOSIS — I10: ICD-10-CM

## 2020-10-27 DIAGNOSIS — E78.5: ICD-10-CM

## 2020-10-27 DIAGNOSIS — G62.9: ICD-10-CM

## 2020-10-27 DIAGNOSIS — G30.9: ICD-10-CM

## 2020-10-27 DIAGNOSIS — F02.80: ICD-10-CM

## 2020-10-27 DIAGNOSIS — N17.0: ICD-10-CM

## 2020-10-27 DIAGNOSIS — R13.10: ICD-10-CM

## 2020-10-27 DIAGNOSIS — G93.41: ICD-10-CM

## 2020-10-27 DIAGNOSIS — E86.0: ICD-10-CM

## 2020-10-27 PROCEDURE — C9803 HOPD COVID-19 SPEC COLLECT: HCPCS

## 2020-10-27 PROCEDURE — U0003 INFECTIOUS AGENT DETECTION BY NUCLEIC ACID (DNA OR RNA); SEVERE ACUTE RESPIRATORY SYNDROME CORONAVIRUS 2 (SARS-COV-2) (CORONAVIRUS DISEASE [COVID-19]), AMPLIFIED PROBE TECHNIQUE, MAKING USE OF HIGH THROUGHPUT TECHNOLOGIES AS DESCRIBED BY CMS-2020-01-R: HCPCS

## 2020-10-27 PROCEDURE — G0378 HOSPITAL OBSERVATION PER HR: HCPCS

## 2020-10-28 VITALS — DIASTOLIC BLOOD PRESSURE: 70 MMHG | SYSTOLIC BLOOD PRESSURE: 121 MMHG

## 2020-10-28 VITALS — SYSTOLIC BLOOD PRESSURE: 121 MMHG | DIASTOLIC BLOOD PRESSURE: 79 MMHG

## 2020-10-28 VITALS — SYSTOLIC BLOOD PRESSURE: 115 MMHG | DIASTOLIC BLOOD PRESSURE: 62 MMHG

## 2020-10-28 VITALS — DIASTOLIC BLOOD PRESSURE: 74 MMHG | SYSTOLIC BLOOD PRESSURE: 132 MMHG

## 2020-10-28 VITALS — SYSTOLIC BLOOD PRESSURE: 132 MMHG | DIASTOLIC BLOOD PRESSURE: 74 MMHG

## 2020-10-28 LAB
ALBUMIN SERPL BCP-MCNC: 3.7 G/DL (ref 3.4–5)
ALP SERPL-CCNC: 78 U/L (ref 46–116)
ALT SERPL W P-5'-P-CCNC: 16 U/L (ref 12–78)
APPEARANCE UR: CLEAR
AST SERPL W P-5'-P-CCNC: 17 U/L (ref 15–37)
BASOPHILS # BLD AUTO: 0 /CMM (ref 0–0.2)
BASOPHILS NFR BLD AUTO: 0.2 % (ref 0–2)
BILIRUB DIRECT SERPL-MCNC: 0.1 MG/DL (ref 0–0.2)
BILIRUB SERPL-MCNC: 0.3 MG/DL (ref 0.2–1)
BILIRUB UR QL STRIP: NEGATIVE
BUN SERPL-MCNC: 22 MG/DL (ref 7–18)
CALCIUM SERPL-MCNC: 8.8 MG/DL (ref 8.5–10.1)
CHLORIDE SERPL-SCNC: 102 MMOL/L (ref 98–107)
CO2 SERPL-SCNC: 30 MMOL/L (ref 21–32)
COLOR UR: YELLOW
CREAT SERPL-MCNC: 1.2 MG/DL (ref 0.6–1.3)
CRP SERPL-MCNC: 11.5 MG/DL (ref 0–0.9)
EOSINOPHIL NFR BLD AUTO: 0.1 % (ref 0–6)
FERRITIN SERPL-MCNC: 549 NG/ML (ref 8–388)
GLUCOSE SERPL-MCNC: 100 MG/DL (ref 74–106)
GLUCOSE UR STRIP-MCNC: NEGATIVE MG/DL
HCT VFR BLD AUTO: 45 % (ref 39–51)
HGB BLD-MCNC: 14.8 G/DL (ref 13.5–17.5)
HGB UR QL STRIP: (no result) ERY/UL
LEUKOCYTE ESTERASE UR QL STRIP: NEGATIVE
LYMPHOCYTES NFR BLD AUTO: 1.2 /CMM (ref 0.8–4.8)
LYMPHOCYTES NFR BLD AUTO: 14.7 % (ref 20–44)
MCHC RBC AUTO-ENTMCNC: 33 G/DL (ref 31–36)
MCV RBC AUTO: 91 FL (ref 80–96)
MONOCYTES NFR BLD AUTO: 0.8 /CMM (ref 0.1–1.3)
MONOCYTES NFR BLD AUTO: 9.3 % (ref 2–12)
NEUTROPHILS # BLD AUTO: 6.1 /CMM (ref 1.8–8.9)
NEUTROPHILS NFR BLD AUTO: 75.7 % (ref 43–81)
NITRITE UR QL STRIP: NEGATIVE
NT-PROBNP SERPL-MCNC: 129 PG/ML (ref 0–125)
PH UR STRIP: 5.5 [PH] (ref 5–8)
PLATELET # BLD AUTO: 229 /CMM (ref 150–450)
POTASSIUM SERPL-SCNC: 3.9 MMOL/L (ref 3.5–5.1)
PROT SERPL-MCNC: 7.9 G/DL (ref 6.4–8.2)
PROT UR QL STRIP: NEGATIVE MG/DL
RBC # BLD AUTO: 4.89 MIL/UL (ref 4.5–6)
RBC #/AREA URNS HPF: (no result) /HPF (ref 0–2)
SODIUM SERPL-SCNC: 139 MMOL/L (ref 136–145)
UROBILINOGEN UR STRIP-MCNC: 0.2 EU/DL
WBC #/AREA URNS HPF: (no result) /HPF (ref 0–3)
WBC NRBC COR # BLD AUTO: 8.1 K/UL (ref 4.3–11)

## 2020-10-28 RX ADMIN — DOXYCYCLINE HYCLATE SCH MG: 100 TABLET, COATED ORAL at 12:07

## 2020-10-28 RX ADMIN — DONEPEZIL HYDROCHLORIDE SCH MG: 5 TABLET ORAL at 22:00

## 2020-10-28 RX ADMIN — DEXAMETHASONE SODIUM PHOSPHATE SCH MG: 10 INJECTION INTRAMUSCULAR; INTRAVENOUS at 12:29

## 2020-10-28 RX ADMIN — DOXYCYCLINE HYCLATE SCH MG: 100 TABLET, COATED ORAL at 11:00

## 2020-10-28 RX ADMIN — QUETIAPINE SCH MG: 25 TABLET, FILM COATED ORAL at 21:00

## 2020-10-28 RX ADMIN — DOXYCYCLINE HYCLATE SCH MG: 100 TABLET, COATED ORAL at 21:00

## 2020-10-28 RX ADMIN — ENOXAPARIN SODIUM SCH MG: 40 INJECTION SUBCUTANEOUS at 12:30

## 2020-10-28 RX ADMIN — ATORVASTATIN CALCIUM SCH MG: 10 TABLET, FILM COATED ORAL at 22:00

## 2020-10-28 RX ADMIN — DEXTROSE MONOHYDRATE SCH MLS/HR: 50 INJECTION, SOLUTION INTRAVENOUS at 23:10

## 2020-10-28 RX ADMIN — DIVALPROEX SODIUM SCH MG: 250 TABLET, DELAYED RELEASE ORAL at 21:00

## 2020-10-28 NOTE — NUR
icu rn 

pt in bed resting non verbal vs stable safety measures taken will cont to 
monitor no fever noted.

## 2020-10-28 NOTE — NUR
RN NOTES

RECEIVED PT. AWAKE, NON-VERBAL, NOT IN DISTRESS, SIDERAILUPX2, BED IN LOCKED POSITION, 
SIDERAILSUPX2, WILL CONTINUE TO MONITOR

## 2020-10-28 NOTE — NUR
RN CLOSING NOTES

PATIENT IN BED, A/OX1, NON VERBAL, UNABLE FOLLOW COMMANDS, COMBATIVE DURING BED BATH, ALL 
COMFORT NEEDS PROVIDED, NPO STATUS MAINTAINED, IV LINE ON RIGHT HAND IS PATENT AND INTACT IV 
FLUIDS D51/2NS RUNNING @ 75 CC/HR, TOLERATING WELL, APPLIED SLEEVE SINCE PATIENT IS TEND TO 
SCRATCH HIMSELF,SAFETY MEASURES IN PLACE, BED IS LOCKED, LOWEST POSITION, HOB ELEVATED, WILL 
ENDORSE TO PM SHIFT RN FOR IGOR

## 2020-10-28 NOTE — NUR
RN ADMITTING NOTES

RECEIVED PATIENT FROM ER VIA [facility], ON ROOM AIR, SPO2 IS 98%, NO S/SX OF DISTRESS 
NOTED, NO SOB NOTED, R AC IV LINE PRESENT, INTACT AND PATENT, NON AMBULATORY BED REST, HAS 
SCRATCHES ALLOVER THE BODY , SCRATCHED HIMSELF , SAFETY MEASURES IN PLACE, HOB ELEVATED, BED 
IS LOCKED AND IN LOWEST POSITION , WILL CONT TO MONITOR

## 2020-10-28 NOTE — NUR
PT AAOX0. BIBPA FROM [facility] C/O FEVER. PT PLACED IN BED 8 ON CARDIAC 
MONITOR AND PULSE OX. UPON ASSESSMENT TEMP .2 ORALLY. MD AT BEDSIDE FOR 
EVAL. AWAITING MD FOR ORDERS. WILL CONTINUE TO MONITOR PT.

## 2020-10-28 NOTE — NUR
pt ao non verbal vs stalbe temp 99 report given to Denise hadley for cont of care pt 
moved to room 209 stable conditions all pt needs meet .

## 2020-10-29 VITALS — DIASTOLIC BLOOD PRESSURE: 71 MMHG | SYSTOLIC BLOOD PRESSURE: 128 MMHG

## 2020-10-29 VITALS — SYSTOLIC BLOOD PRESSURE: 128 MMHG | DIASTOLIC BLOOD PRESSURE: 71 MMHG

## 2020-10-29 VITALS — DIASTOLIC BLOOD PRESSURE: 70 MMHG | SYSTOLIC BLOOD PRESSURE: 144 MMHG

## 2020-10-29 VITALS — SYSTOLIC BLOOD PRESSURE: 145 MMHG | DIASTOLIC BLOOD PRESSURE: 64 MMHG

## 2020-10-29 LAB
ALBUMIN SERPL BCP-MCNC: 3.5 G/DL (ref 3.4–5)
ALP SERPL-CCNC: 70 U/L (ref 46–116)
ALT SERPL W P-5'-P-CCNC: 13 U/L (ref 12–78)
AST SERPL W P-5'-P-CCNC: 20 U/L (ref 15–37)
BASOPHILS # BLD AUTO: 0 /CMM (ref 0–0.2)
BASOPHILS NFR BLD AUTO: 0.1 % (ref 0–2)
BILIRUB SERPL-MCNC: 0.2 MG/DL (ref 0.2–1)
BUN SERPL-MCNC: 23 MG/DL (ref 7–18)
CALCIUM SERPL-MCNC: 8.9 MG/DL (ref 8.5–10.1)
CHLORIDE SERPL-SCNC: 103 MMOL/L (ref 98–107)
CO2 SERPL-SCNC: 26 MMOL/L (ref 21–32)
CREAT SERPL-MCNC: 1.2 MG/DL (ref 0.6–1.3)
EOSINOPHIL NFR BLD AUTO: 0 % (ref 0–6)
FERRITIN SERPL-MCNC: 632 NG/ML (ref 8–388)
GLUCOSE SERPL-MCNC: 107 MG/DL (ref 74–106)
HCT VFR BLD AUTO: 41 % (ref 39–51)
HGB BLD-MCNC: 13.7 G/DL (ref 13.5–17.5)
LYMPHOCYTES NFR BLD AUTO: 0.4 /CMM (ref 0.8–4.8)
LYMPHOCYTES NFR BLD AUTO: 6.6 % (ref 20–44)
MCHC RBC AUTO-ENTMCNC: 33 G/DL (ref 31–36)
MCV RBC AUTO: 90 FL (ref 80–96)
MONOCYTES NFR BLD AUTO: 0.9 /CMM (ref 0.1–1.3)
MONOCYTES NFR BLD AUTO: 14.1 % (ref 2–12)
NEUTROPHILS # BLD AUTO: 5.1 /CMM (ref 1.8–8.9)
NEUTROPHILS NFR BLD AUTO: 79.2 % (ref 43–81)
PLATELET # BLD AUTO: 230 /CMM (ref 150–450)
POTASSIUM SERPL-SCNC: 3.8 MMOL/L (ref 3.5–5.1)
PROT SERPL-MCNC: 7.6 G/DL (ref 6.4–8.2)
RBC # BLD AUTO: 4.56 MIL/UL (ref 4.5–6)
SODIUM SERPL-SCNC: 140 MMOL/L (ref 136–145)
WBC NRBC COR # BLD AUTO: 6.5 K/UL (ref 4.3–11)

## 2020-10-29 RX ADMIN — QUETIAPINE SCH MG: 25 TABLET, FILM COATED ORAL at 09:43

## 2020-10-29 RX ADMIN — ENOXAPARIN SODIUM SCH MG: 40 INJECTION SUBCUTANEOUS at 11:35

## 2020-10-29 RX ADMIN — ATORVASTATIN CALCIUM SCH MG: 10 TABLET, FILM COATED ORAL at 21:03

## 2020-10-29 RX ADMIN — DIVALPROEX SODIUM SCH MG: 250 TABLET, DELAYED RELEASE ORAL at 09:43

## 2020-10-29 RX ADMIN — DEXAMETHASONE SODIUM PHOSPHATE SCH MG: 10 INJECTION INTRAMUSCULAR; INTRAVENOUS at 09:43

## 2020-10-29 RX ADMIN — DOXYCYCLINE HYCLATE SCH MG: 100 TABLET, COATED ORAL at 21:02

## 2020-10-29 RX ADMIN — DIVALPROEX SODIUM SCH MG: 250 TABLET, DELAYED RELEASE ORAL at 21:03

## 2020-10-29 RX ADMIN — DOXYCYCLINE HYCLATE SCH MG: 100 TABLET, COATED ORAL at 09:43

## 2020-10-29 RX ADMIN — QUETIAPINE SCH MG: 25 TABLET, FILM COATED ORAL at 21:03

## 2020-10-29 RX ADMIN — TRIAMCINOLONE ACETONIDE SCH GM: 1 OINTMENT TOPICAL at 10:38

## 2020-10-29 RX ADMIN — DONEPEZIL HYDROCHLORIDE SCH MG: 5 TABLET ORAL at 21:03

## 2020-10-29 RX ADMIN — DEXTROSE MONOHYDRATE SCH MLS/HR: 50 INJECTION, SOLUTION INTRAVENOUS at 22:49

## 2020-10-29 NOTE — NUR
MS RN OPENING NOTES

PATIENT SLEEPING; OPENS EYES TO TOUCH. A/OX1. ON RA; NO S/S OF ACUTE RESPIRATORY DISTRESS; 
BREATHING IS EVEN AND UNLABORED. NO S/S OF PAIN NOTED. IV PRESENT ON RIGHT AC, SIZE 20, 
INTACT & PATENT WITH NS RUNNING TKO. CONTACT/DROPLET PRECAUTIONS IN PLACE FOR POSITIVE COVID 
19. SAFETY MEASURES IN PLACE AND PATIENT'S NEEDS MET. BED LOCKED, ALARM ON, HOB ELEVATED, 
SIDE RAILS X3, CALL LIGHT WITHIN REACH. WILL CONTINUE TO MONITOR.

## 2020-10-29 NOTE — NUR
MS RN NOTES

PATIENT IN BED EYES CLOSED, RESPOND TO VERBAL AND TACTILE STIMULI, EASY TO AROUSE. NO ACUTE 
DISTRESS NOTED. BREATHING UNLABORED. NO SOB NOTED. IV ACCESS PATENT AND INTACT, NO REDNESS, 
NO SWELLING NOTED.SAFETY MEASURES IN PLACE. CALL LIGHT WITHIN REACH. WILL CONTINUE TO 
MONITOR ACCORDINGLY.

## 2020-10-29 NOTE — NUR
MS RN NOTES

PATIENT IN BED AWAKE.NO ACUTE DISTRESS NOTED. BREATHING UNLABORED. NO SOB NOTED. IV ACCESS 
PATENT AND INTACT, NO REDNESS, NO SWELLING NOTED. NEEDS ATTENDED AND ANTICIPATED. KEPT CLEAN 
DRY AND COMFORTABLE. SAFETY MEASURES IN PLACE. CALL LIGHT WITHIN REACH. WILL ENDORSE TO 
NIGHT NURSE FOR CONTINUITY OF CARE.

## 2020-10-29 NOTE — NUR
RN NOTES

SLEEPING BUT AROUSABLE, MORNING CARE RENDERED, NOT IN DISTRESS, NO SOB, [id number], PT. 
NEEDS ATTENDED

## 2020-10-30 VITALS — SYSTOLIC BLOOD PRESSURE: 130 MMHG | DIASTOLIC BLOOD PRESSURE: 84 MMHG

## 2020-10-30 VITALS — SYSTOLIC BLOOD PRESSURE: 128 MMHG | DIASTOLIC BLOOD PRESSURE: 84 MMHG

## 2020-10-30 VITALS — SYSTOLIC BLOOD PRESSURE: 129 MMHG | DIASTOLIC BLOOD PRESSURE: 67 MMHG

## 2020-10-30 VITALS — DIASTOLIC BLOOD PRESSURE: 66 MMHG | SYSTOLIC BLOOD PRESSURE: 126 MMHG

## 2020-10-30 VITALS — SYSTOLIC BLOOD PRESSURE: 129 MMHG | DIASTOLIC BLOOD PRESSURE: 85 MMHG

## 2020-10-30 LAB
BASE EXCESS BLDA CALC-SCNC: 3.4 MMOL/L
DO-HGB MFR BLDA: 124 MMHG
INHALED O2 CONCENTRATION: 36 %
INHALED O2 FLOW RATE: 4 L/MIN (ref 0–30)
PCO2 TEMP ADJ BLDA: 37.6 MMHG (ref 35–45)
PH TEMP ADJ BLDA: 7.47 [PH] (ref 7.35–7.45)
PO2 TEMP ADJ BLDA: 89.1 MMHG (ref 75–100)
SAO2 % BLDA: 97.3 % (ref 92–98.5)
VENTILATION MODE VENT: (no result)

## 2020-10-30 RX ADMIN — DOXYCYCLINE HYCLATE SCH MG: 100 TABLET, COATED ORAL at 08:18

## 2020-10-30 RX ADMIN — TRIAMCINOLONE ACETONIDE SCH GM: 1 OINTMENT TOPICAL at 08:31

## 2020-10-30 RX ADMIN — ENOXAPARIN SODIUM SCH MG: 40 INJECTION SUBCUTANEOUS at 10:08

## 2020-10-30 RX ADMIN — QUETIAPINE SCH MG: 25 TABLET, FILM COATED ORAL at 08:18

## 2020-10-30 RX ADMIN — DONEPEZIL HYDROCHLORIDE SCH MG: 5 TABLET ORAL at 22:00

## 2020-10-30 RX ADMIN — ACETAMINOPHEN PRN MG: 325 TABLET ORAL at 15:45

## 2020-10-30 RX ADMIN — DIVALPROEX SODIUM SCH MG: 250 TABLET, DELAYED RELEASE ORAL at 08:18

## 2020-10-30 RX ADMIN — DIVALPROEX SODIUM SCH MG: 250 TABLET, DELAYED RELEASE ORAL at 20:40

## 2020-10-30 RX ADMIN — QUETIAPINE SCH MG: 25 TABLET, FILM COATED ORAL at 20:40

## 2020-10-30 RX ADMIN — Medication SCH OZ: at 11:46

## 2020-10-30 RX ADMIN — DEXTROSE MONOHYDRATE SCH MLS/HR: 50 INJECTION, SOLUTION INTRAVENOUS at 22:32

## 2020-10-30 RX ADMIN — DOXYCYCLINE HYCLATE SCH MG: 100 TABLET, COATED ORAL at 20:40

## 2020-10-30 RX ADMIN — ATORVASTATIN CALCIUM SCH MG: 10 TABLET, FILM COATED ORAL at 22:00

## 2020-10-30 NOTE — NUR
MS RN NOTES



PATIENT IN BED, RESTING COMFORTABLY, ALERT AND ORIENTED X1 . ON ROOM AIR WITH NO SIGNS OF 
RESPIRATORY DISTRESS WITH EVEN NON-LABORED BREATHING, AND NO SOB NOTED. PATIENT HAS LOW 
GRADE TEMPERATURE, COOLING MEASURES PROVIDED, AND PO 650mg of TYLENOL ADMINISTERED TO 
PATIENT AS ORDERED. PATIENT PRESENTS WITH NO PAIN OR DISCOMFORT AT THIS TIME. MET ALL OF 
PATIENT'S NEEDS. ISOLATION PRECAUTIONS REMAINING. SAFETY PRECAUTIONS IMPLEMENTED WITH BED 
LOCKED, BED IN THE LOWEST POSITION, BILATERAL SIDE RAILS, BED ALARM ON AND CALL LIGHT WITHIN 
EASY REACH OF THE PATIENT. WILL ENDORSE PLAN OF CARE TO UPCOMING RN.

## 2020-10-30 NOTE — NUR
MS/RN NOTES (RAPID RESPONSE):



CALLED RAPID RESPONSE 2245. PT WAS UNRESPONSIVE TO PAINFUL STIMULI. OXYGEN SATURATING 
FLUCTUATING FROM 90-88%. VS TAKEN 130/84 HR:97 TEMP 98.6. RR:16, BLOOD SUGAR 74. RRT ARRIVED 
AT UNIT WITH ON CALL NP RAMAN ROBB [id number]. RESP. THERAPISTS AT BEDSIDE, MORRO. PLACED 
PT ON 6L OXYGEN VIA NC. STERNAL RUB DONE. PAINFUL STIMULI APPLIED MULTIPLE TIMES. PT STARTED 
TO BECOME MORE AROUSABLE. RAMAN ROBB ORDERED ABG. AWAITING FOR RESULTS AND WILL F/U. WILL 
CONT. TO MONITOR.

## 2020-10-30 NOTE — NUR
MS/RN OPENING NOTES:



RECEIVED PATIENT RESTING IN BED; A/OX1. ON RA; NO SOB NOTED, ON ROOM AIR, SATURATING WELL. 
NO S/S OF ACUTE RESPIRATORY DISTRESS; BREATHING IS EVEN AND UNLABORED. NO COMPLAINS OF PAIN 
NOTED. IV PRESENT ON RIGHT AC, #20G, INTACT & PATENT WITH NS RUNNING TKO, SL. 
CONTACT/DROPLET PRECAUTIONS IN PLACE FOR POSITIVE COVID 19. SAFETY MEASURES IN PLACE AND 
PATIENT'S NEEDS MET. BED LOCKED, ALARM ON, HOB ELEVATED, SIDE RAILS X3, CALL LIGHT WITHIN 
REACH. WILL CONTINUE TO MONITOR ACCORDINGLY.

## 2020-10-30 NOTE — NUR
MS RN NOTES



PATIENT RECEIVED IN BED, SLEEPING, ALERT AND ORIENTED X 1. ON ROOM AIR WITH NO SIGNS OF 
RESPIRATORY DISTRESS AT THIS TIME, WITH NON-LABORED BREATHING. PATIENT PRESENTS WITH NO PAIN 
OR DISCOMFORT AT THIS TIME. IV ACCESS INTACT AND PATENT. SKIN WARM AND DRY TO TOUCH. 
ISOLATION PRECAUTIONS IMPLEMENTED. SAFETY PRECAUTIONS IMPLEMENTED WITH BED LOCKED, BED IN 
THE LOWEST POSITION, BILATERAL SIDE RAILS UP, BED ALARM ON, AND CALL LIGHT WITHIN EASY REACH 
OF PATIENT. WILL CONTINUE TO MONITOR.

## 2020-10-30 NOTE — NUR
MS RN CLOSING NOTES

NO ADVERSE EVENTS DURING SHIFT; REMAINED STABLE. PATIENT SLEEPING, AWAKENS TO TOUCH. A/OX1. 
STABLE ON RA; NO S/S OF ACUTE RESPIRATORY DISTRESS; BREATHING IS EVEN AND UNLABORED. NO S/S 
OF PAIN NOTED. IV PRESENT ON RIGHT AC, SIZE 20, INTACT & PATENT WITH NS RUNNING TKO. SAFETY 
MEASURES IN PLACE AND PATIENT'S NEEDS MET. BED LOCKED, ALARM ON, HOB ELEVATED, SIDE RAILS 
X3, CALL LIGHT WITHIN REACH. WILL ENDORSE TO DAY SHIFT RN PLAN OF CARE. Name band;

## 2020-10-30 NOTE — NUR
WOUND CARE CONSULT: REVIEWED CHART, NURSING DOCUMENTATION AND PHOTOS WHICH INDICATE DRY 
SCABS ON ARMS, PERIANAL REDNESS, PRESENT ON ADMISSION. RECOMMENDATIONS MADE FOR SKIN 
PROTECTION. DISCUSSED WITH NURSING STAFF. CURRENT STEPH SCORE IS 14. WILL SEE PRN. MD IN 
AGREEMENT WITH PLAN OF CARE.

## 2020-10-31 VITALS — DIASTOLIC BLOOD PRESSURE: 61 MMHG | SYSTOLIC BLOOD PRESSURE: 144 MMHG

## 2020-10-31 VITALS — SYSTOLIC BLOOD PRESSURE: 150 MMHG | DIASTOLIC BLOOD PRESSURE: 70 MMHG

## 2020-10-31 VITALS — SYSTOLIC BLOOD PRESSURE: 116 MMHG | DIASTOLIC BLOOD PRESSURE: 61 MMHG

## 2020-10-31 VITALS — SYSTOLIC BLOOD PRESSURE: 119 MMHG | DIASTOLIC BLOOD PRESSURE: 74 MMHG

## 2020-10-31 VITALS — SYSTOLIC BLOOD PRESSURE: 140 MMHG | DIASTOLIC BLOOD PRESSURE: 72 MMHG

## 2020-10-31 VITALS — SYSTOLIC BLOOD PRESSURE: 124 MMHG | DIASTOLIC BLOOD PRESSURE: 67 MMHG

## 2020-10-31 LAB
BASOPHILS # BLD AUTO: 0 /CMM (ref 0–0.2)
BASOPHILS NFR BLD AUTO: 0.3 % (ref 0–2)
BUN SERPL-MCNC: 23 MG/DL (ref 7–18)
CALCIUM SERPL-MCNC: 9.2 MG/DL (ref 8.5–10.1)
CHLORIDE SERPL-SCNC: 106 MMOL/L (ref 98–107)
CO2 SERPL-SCNC: 22 MMOL/L (ref 21–32)
CREAT SERPL-MCNC: 1.1 MG/DL (ref 0.6–1.3)
EOSINOPHIL NFR BLD AUTO: 0.1 % (ref 0–6)
GLUCOSE SERPL-MCNC: 89 MG/DL (ref 74–106)
HCT VFR BLD AUTO: 47 % (ref 39–51)
HGB BLD-MCNC: 15.2 G/DL (ref 13.5–17.5)
LYMPHOCYTES NFR BLD AUTO: 1 /CMM (ref 0.8–4.8)
LYMPHOCYTES NFR BLD AUTO: 23.1 % (ref 20–44)
MCHC RBC AUTO-ENTMCNC: 32 G/DL (ref 31–36)
MCV RBC AUTO: 93 FL (ref 80–96)
MONOCYTES NFR BLD AUTO: 0.9 /CMM (ref 0.1–1.3)
MONOCYTES NFR BLD AUTO: 19.8 % (ref 2–12)
NEUTROPHILS # BLD AUTO: 2.4 /CMM (ref 1.8–8.9)
NEUTROPHILS NFR BLD AUTO: 56.7 % (ref 43–81)
PLATELET # BLD AUTO: 212 /CMM (ref 150–450)
POTASSIUM SERPL-SCNC: 5 MMOL/L (ref 3.5–5.1)
RBC # BLD AUTO: 5.08 MIL/UL (ref 4.5–6)
SODIUM SERPL-SCNC: 144 MMOL/L (ref 136–145)
WBC NRBC COR # BLD AUTO: 4.3 K/UL (ref 4.3–11)

## 2020-10-31 RX ADMIN — DIVALPROEX SODIUM SCH MG: 250 TABLET, DELAYED RELEASE ORAL at 08:33

## 2020-10-31 RX ADMIN — DOXYCYCLINE HYCLATE SCH MG: 100 TABLET, COATED ORAL at 21:31

## 2020-10-31 RX ADMIN — ACETAMINOPHEN PRN MG: 325 TABLET ORAL at 12:26

## 2020-10-31 RX ADMIN — DOXYCYCLINE HYCLATE SCH MG: 100 TABLET, COATED ORAL at 08:33

## 2020-10-31 RX ADMIN — ENOXAPARIN SODIUM SCH MG: 40 INJECTION SUBCUTANEOUS at 11:06

## 2020-10-31 RX ADMIN — QUETIAPINE SCH MG: 25 TABLET, FILM COATED ORAL at 21:31

## 2020-10-31 RX ADMIN — ATORVASTATIN CALCIUM SCH MG: 10 TABLET, FILM COATED ORAL at 21:31

## 2020-10-31 RX ADMIN — DEXTROSE MONOHYDRATE SCH MLS/HR: 50 INJECTION, SOLUTION INTRAVENOUS at 22:23

## 2020-10-31 RX ADMIN — DIVALPROEX SODIUM SCH MG: 250 TABLET, DELAYED RELEASE ORAL at 21:31

## 2020-10-31 RX ADMIN — QUETIAPINE SCH MG: 25 TABLET, FILM COATED ORAL at 08:33

## 2020-10-31 RX ADMIN — TRIAMCINOLONE ACETONIDE SCH GM: 1 OINTMENT TOPICAL at 08:31

## 2020-10-31 RX ADMIN — DONEPEZIL HYDROCHLORIDE SCH MG: 5 TABLET ORAL at 21:31

## 2020-10-31 RX ADMIN — Medication SCH OZ: at 08:31

## 2020-10-31 NOTE — NUR
MS RN OPENING NOTE



Patient in bed sleeping comfortably. Patient is breathing well on 3L Oxygen via nasal 
canula. No SOB or acute respiratory distress noted. IV access noted on right AC, 20 gauge, 
dressing dry and intact, no redness, or infiltration. Safety precaution in place, bed is in 
the lowest level, bed is locked, alarm is on, side rials x2 are up, and call light is within 
reach. Will continue to monitor.

## 2020-10-31 NOTE — NUR
MS RN OPENING NOTE



PATIENT IN BED RESTING COMFORTABLY. PATIENT IN NO ACUTE DISTRESS. NO SOB NOTED. PATIENT 
BREATHING IS EVEN AND UNLABORED. PATIENT AROUSABLE TO PAINFUL STIMULI. PATIENT SAFETY 
PRECAUTIONS IN PLACE. HOB IS ELEVATED. BED ALARM IS ON. MAINTAINED ISOLATION PRECAUTIONS. 
PATIENT BED IS LOCKED AND IN LOWEST POSITION. CALL LIGHT WITHIN REACH. WILL CONTINUE TO 
MONITOR.

## 2020-10-31 NOTE — NUR
MS RN NOTE



PATIENT SEEN AND EVALUATED BY TIM NP. INFORMED NEHRA OF RECENT BLADDER RESIDUALS THAT WERE 
TO BE MEASURES Q8H. PER NEHRA ORDER TO CANCEL BLADDER SCANS BECAUSE PATIENT IS FREQUENTLY 
URINATING AND NOT RETAINING.

## 2020-10-31 NOTE — NUR
MS/RN NOTES:



GAVE PATIENT BED BATH. MORE AWAKE NOW, AROUSABLE TO STIMULI. TEMP IS NOW 98.7 ORALLY. PT. 
STABLE, WILL CONT. TO MONITOR.

## 2020-10-31 NOTE — NUR
MS/RN NOTES:



PT URINATES FREQUENTLY. BLADDER SCANNED WITH RESIDUAL OF 80-120ML. PER NERA, ID CONSULT: 
BLADDER SCAN WITH STRAIGHT CATH FOR RESIDUALS 300CC OR MORE Q8HR.

## 2020-10-31 NOTE — NUR
Internal Medicine Progress Note  Patient: Rasheed Martin During  Age/sex: 69 y o  male  Medical Record #: [id number]      ASSESSMENT/PLAN: (Interval History)  Rasheed Martin During is seen and examined and management for following issues:    Cervical spinal stenosis/cord compression with progressive quadraparesis; s/p C3/4 ACDF with C4 hemicorpectomy, C3-4 anterior instrumentation/fusion 6/18/19 Lujuan miguel Campbell):  continue cervical collar; on Clindamycin for infection prophylaxis  Pain control per primary service  Had previously been on Cymbalta for neuropathic sx      DM2: at home he takes Kombiglyze XR 5-1000mg qd and no insulin  Continue Metformin 500mg daily  Continue DM diet and Accuchecks/QID with SSI      HTN:  at home, takes Cardizem 180mg qd, Losartan 100mg qd (was not on Norvasc he says); here on Card 120mg qd, Losartan 100mg qd  Blood pressure improved      COPD; hx lung cancer/VIVIANA lobectomy, pulmonary nodules: follows with pulm as OP and takes Advair 250-50 one puff BID/Spiriva one puff daily and prn Combivent  On Breo as substitute for Advair, continue Spiriva and use Albuterol inhaler prn     SHIVANI: supposed to use CPAP but has been noncompliant     HLD: at home on Pravachol     Urinary incontinence/BPH/urgency: continue Flomax, Oxybutynin ER 5mg qd; Uro is following         Subjective/ HPI:  Patients overnight issues or events were reviewed with nursing or staff during rounds or morning huddle session  No new or overnight issues  Hypokalemia resolved  DM/HTN:  Stable  Patient denies any current complaints      ROS:     GI: denies abdominal pain, change bowel habits or reflux symptoms  Neuro: Denies any headache, new vision changes, new neuropathies,new weaknesses   Respiratory: No Cough, SOB, denies wheeze  Cardiovascular: No CP, palpitations , denies perception of rapid heartbeat  : denies any new urinary burning or frequency    Review of Scheduled Meds:    Current Facility-Administered Medications:  acetaminophen 650 MS/RN CLOSING NOTES:



PATIENT REMAINS RESTING IN BED; A/OX1. AROUSABLE TO PAINFUL STIMULI. REMAINS STABLE ON RA; 
NO SOB NOTED, ON 3L OF OXYGEN VIA NC, SATURATING WELL AT 96%. NO S/S OF ACUTE RESPIRATORY 
DISTRESS; BREATHING IS EVEN AND UNLABORED. NO COMPLAINS OF PAIN NOTED. IV PRESENT ON RIGHT 
AC, #20G, INTACT & PATENT WITH NS RUNNING TKO, SL. CONTACT/DROPLET PRECAUTIONS IN PLACE FOR 
POSITIVE COVID 19. SAFETY MEASURES IN PLACE AND PATIENT'S NEEDS MET. BED LOCKED, ALARM ON, 
HOB ELEVATED, SIDE RAILS X3, CALL LIGHT WITHIN REACH. ALL NURSING NEEDS MET AND RENDERED. 
KEPT CLEAN AND DRT, WARM AND COMFORTABLE AT ALL TIMES. WILL ENDORSE TO DAY SHIFT FOR IGOR. mg Oral TID Katarina Faustin MD   albuterol 2 puff Inhalation Q4H PRN Katarina Faustin MD   bisacodyl 10 mg Rectal Daily PRN Katarina Faustin MD   bisacodyl 10 mg Rectal Once Katarina Faustin MD   cholecalciferol 2,000 Units Oral Daily Katarina Faustin MD   clindamycin 300 mg Oral [facility] Katarina Faustin MD   diltiazem 120 mg Oral Daily Katarina Faustin MD   docusate sodium 100 mg Oral BID Katarina Faustin MD   DULoxetine 60 mg Oral Daily Katarina Faustin MD   fluticasone-vilanterol 1 puff Inhalation Daily Katarina Faustin MD   gabapentin 300 mg Oral BID Katarina Faustin MD   gabapentin 400 mg Oral HS Katarina Faustin MD   heparin (porcine) 5,000 Units Subcutaneous [facility] Katarina Faustin MD   hydrALAZINE 25 mg Oral Q8H PRN SONJA Bolanos   insulin lispro 1-5 Units Subcutaneous TID [facility] Katarina Faustin MD   insulin lispro 1-5 Units Subcutaneous HS Katarina Faustin MD   lidocaine  Topical Q4H PRN Katarina Faustin MD   losartan 100 mg Oral Daily Katarina Faustin MD   melatonin 3 mg Oral HS Mery Grewal PA-C   metFORMIN 500 mg Oral Daily With Breakfast SONJA Bolanos   nicotine 21 mg Transdermal Daily Mery Grewal PA-C   ondansetron 4 mg Intravenous Q6H PRN Katarina Faustin MD   oxybutynin 5 mg Oral Daily SONJA Haley   oxyCODONE 10 mg Oral Q4H PRN Katarina Faustin MD   oxyCODONE 5 mg Oral Q4H PRN Katarina Faustin MD   pantoprazole 40 mg Oral Daily Before Breakfast Katarina Faustin MD   polyethylene glycol 17 g Oral Daily PRN Katarina Faustin MD   polyethylene glycol 17 g Oral Daily Katarina Faustin MD   polyvinyl alcohol 1 drop Both Eyes 4x Daily Katarina Faustin MD   polyvinyl alcohol 1 drop Both Eyes Q1H PRN Katarina Faustin MD   pravastatin 10 mg Oral Daily With Joshua Hu MD   senna 1 tablet Oral Daily Katarina Faustin MD   tamsulosin 0 4 mg Oral After Jessica Dee MD   thiamine 50 mg Oral Daily Katarina Faustin MD   tiotropium 18 mcg Inhalation Daily Katarina Faustin MD       Labs:     Results from last 7 days   Lab Units 06/25/19  0559 06/24/19  1219   WBC Thousand/uL 4 10* 3 85*   HEMOGLOBIN g/dL 13 7 13 6   HEMATOCRIT % 42 4 43 0   PLATELETS Thousands/uL 157 161     Results from last 7 days   Lab Units 06/25/19  0559 06/24/19  1219   SODIUM mmol/L 141 142   POTASSIUM mmol/L 4 9 4 5   CHLORIDE mmol/L 106 106   CO2 mmol/L 33* 34*   BUN mg/dL 18 17   CREATININE mg/dL 0 93 0 85   CALCIUM mg/dL 10 6* 10 0                  Results from last 7 days   Lab Units 06/25/19  1057 06/25/19  0639 06/24/19  2129   POC GLUCOSE mg/dl 103 103 129       Imaging:     No orders to display       *Labs reviewed  *Radiology studies reviewed  *Medications reviewed and reconciled as needed  *Please refer to order section for additional ordered labs studies  *Case discussed with primary attending during morning huddle case rounds    Physical Examination:  Vitals:   Vitals:    06/24/19 0923 06/24/19 1355 06/24/19 2041 06/25/19 0552   BP: 122/80 126/83 149/83 155/54   BP Location: Right arm Right arm Left arm Right arm   Pulse: 90 85 84 61   Resp:  14 18 18   Temp:  98 7 °F (37 1 °C) 98 7 °F (37 1 °C) 98 4 °F (36 9 °C)   TempSrc:  Oral Oral Oral   SpO2:  99% 98% 100%   Weight:       Height:           General Appearance: NAD, conversive  Eyes: No icterus; conjunctiva normal  HENT: oropharynx clear; mucous membranes moist  Neck: collar on  Lungs: CTA, normal respiratory effort, no retractions, expiratory effort normal  CV: regular rate, no rubs/murmurs/gallops  ABD: soft; ND/NT; +BS  EXT: no edema  Skin: normal turgor, normal texture, no rashes  Psych: affect normal, No anxiety/depression   Neuro: AAOx3;  UE 4/5 R>L, LEs 4+/5 R>L          Total time spent: At least 40 minutes, with more than 50% spent counseling/coordinating care  Counseling includes discussion with patient re: progress  and discussion with patient of his/her current medical state/information   Coordination of patient's care was performed in conjunction with primary service  Time invested included review of patient's labs, vitals, and management of their comorbidities with continued monitoring  In addition, this patient was discussed with medical team including physician and advanced extenders  The care of the patient was extensively discussed and appropriate treatment plan was formulated unique for this patient  ** Please Note: Dragon 360 Dictation voice to text software may have been used in the creation of this document   **

## 2020-11-01 VITALS — SYSTOLIC BLOOD PRESSURE: 148 MMHG | DIASTOLIC BLOOD PRESSURE: 77 MMHG

## 2020-11-01 VITALS — DIASTOLIC BLOOD PRESSURE: 72 MMHG | SYSTOLIC BLOOD PRESSURE: 126 MMHG

## 2020-11-01 VITALS — DIASTOLIC BLOOD PRESSURE: 65 MMHG | SYSTOLIC BLOOD PRESSURE: 109 MMHG

## 2020-11-01 LAB
BASOPHILS # BLD AUTO: 0 /CMM (ref 0–0.2)
BASOPHILS NFR BLD AUTO: 0.4 % (ref 0–2)
BUN SERPL-MCNC: 22 MG/DL (ref 7–18)
CALCIUM SERPL-MCNC: 9.3 MG/DL (ref 8.5–10.1)
CHLORIDE SERPL-SCNC: 106 MMOL/L (ref 98–107)
CO2 SERPL-SCNC: 28 MMOL/L (ref 21–32)
CREAT SERPL-MCNC: 0.8 MG/DL (ref 0.6–1.3)
EOSINOPHIL NFR BLD AUTO: 0.5 % (ref 0–6)
GLUCOSE SERPL-MCNC: 92 MG/DL (ref 74–106)
HCT VFR BLD AUTO: 44 % (ref 39–51)
HGB BLD-MCNC: 14.4 G/DL (ref 13.5–17.5)
LYMPHOCYTES NFR BLD AUTO: 0.7 /CMM (ref 0.8–4.8)
LYMPHOCYTES NFR BLD AUTO: 18.6 % (ref 20–44)
MCHC RBC AUTO-ENTMCNC: 33 G/DL (ref 31–36)
MCV RBC AUTO: 91 FL (ref 80–96)
MONOCYTES NFR BLD AUTO: 0.4 /CMM (ref 0.1–1.3)
MONOCYTES NFR BLD AUTO: 12.2 % (ref 2–12)
NEUTROPHILS # BLD AUTO: 2.4 /CMM (ref 1.8–8.9)
NEUTROPHILS NFR BLD AUTO: 68.3 % (ref 43–81)
PLATELET # BLD AUTO: 201 /CMM (ref 150–450)
POTASSIUM SERPL-SCNC: 3.7 MMOL/L (ref 3.5–5.1)
RBC # BLD AUTO: 4.79 MIL/UL (ref 4.5–6)
SODIUM SERPL-SCNC: 143 MMOL/L (ref 136–145)
WBC NRBC COR # BLD AUTO: 3.5 K/UL (ref 4.3–11)

## 2020-11-01 RX ADMIN — ENOXAPARIN SODIUM SCH MG: 40 INJECTION SUBCUTANEOUS at 09:49

## 2020-11-01 RX ADMIN — QUETIAPINE SCH MG: 25 TABLET, FILM COATED ORAL at 21:34

## 2020-11-01 RX ADMIN — Medication SCH OZ: at 09:49

## 2020-11-01 RX ADMIN — DOXYCYCLINE HYCLATE SCH MG: 100 TABLET, COATED ORAL at 21:34

## 2020-11-01 RX ADMIN — DEXTROSE MONOHYDRATE SCH MLS/HR: 50 INJECTION, SOLUTION INTRAVENOUS at 23:31

## 2020-11-01 RX ADMIN — QUETIAPINE SCH MG: 25 TABLET, FILM COATED ORAL at 09:47

## 2020-11-01 RX ADMIN — TRIAMCINOLONE ACETONIDE SCH GM: 1 OINTMENT TOPICAL at 09:48

## 2020-11-01 RX ADMIN — DIVALPROEX SODIUM SCH MG: 250 TABLET, DELAYED RELEASE ORAL at 21:34

## 2020-11-01 RX ADMIN — ATORVASTATIN CALCIUM SCH MG: 10 TABLET, FILM COATED ORAL at 21:34

## 2020-11-01 RX ADMIN — DONEPEZIL HYDROCHLORIDE SCH MG: 5 TABLET ORAL at 21:34

## 2020-11-01 RX ADMIN — DIVALPROEX SODIUM SCH MG: 250 TABLET, DELAYED RELEASE ORAL at 09:47

## 2020-11-01 RX ADMIN — DOXYCYCLINE HYCLATE SCH MG: 100 TABLET, COATED ORAL at 09:47

## 2020-11-01 NOTE — NUR
Patient is in bed sleeping comfortably. Breathing unlabored and even on 3L Oxygen via nasal 
canula. No SOB or acute respiratory distress noted. IV access noted on right AC, 20 gauge, 
dressing dry and intact, flushing well. Safety precaution in place, bed is in the lowest 
level, bed is locked, alarm is on, side rials x2 are up, and call light is within reach. 
Will continue to monitor.

## 2020-11-01 NOTE — NUR
RN NOTE:



Patient in bed sleeping comfortably. Patient is breathing well and shows no signs of SOB or 
acute respiratory distress. Safety precaution is maintained. Bed is in the lowest position, 
bed is locked, alarm is on, side rails x2 are up, and call light is within reach. Will 
endorse to next shift.

## 2020-11-01 NOTE — NUR
Patient in bed sleeping comfortably. Patient is breathing well on 3L Oxygen via nasal 
canula. Breathing even and unlabored. No SOB or acute respiratory distress noted. IV access 
noted on right hand, 22 gauge, dressing dry and intact, no redness, or infiltration. Safety 
precaution in place, bed is in the lowest level, bed is locked, alarm is on, side rials x2 
are up, and call light is within reach. Will continue to monitor.

## 2020-11-01 NOTE — NUR
Patient resting in bed comfortably. Remains stable on 3L oxygen via nasal canula. VS are 
stable . All needs attended ; patient kept clean and dry. Will endorse to next shift for IGOR

## 2020-11-02 VITALS — DIASTOLIC BLOOD PRESSURE: 69 MMHG | SYSTOLIC BLOOD PRESSURE: 140 MMHG

## 2020-11-02 VITALS — DIASTOLIC BLOOD PRESSURE: 64 MMHG | SYSTOLIC BLOOD PRESSURE: 130 MMHG

## 2020-11-02 VITALS — DIASTOLIC BLOOD PRESSURE: 70 MMHG | SYSTOLIC BLOOD PRESSURE: 127 MMHG

## 2020-11-02 LAB
BASE EXCESS BLDA CALC-SCNC: 0.2 MMOL/L
BASOPHILS # BLD AUTO: 0 /CMM (ref 0–0.2)
BASOPHILS NFR BLD AUTO: 0.4 % (ref 0–2)
DO-HGB MFR BLDA: 40.5 MMHG
EOSINOPHIL NFR BLD AUTO: 0.5 % (ref 0–6)
HCT VFR BLD AUTO: 43 % (ref 39–51)
HGB BLD-MCNC: 14 G/DL (ref 13.5–17.5)
INHALED O2 CONCENTRATION: 21 %
LYMPHOCYTES NFR BLD AUTO: 0.7 /CMM (ref 0.8–4.8)
LYMPHOCYTES NFR BLD AUTO: 18.4 % (ref 20–44)
MCHC RBC AUTO-ENTMCNC: 33 G/DL (ref 31–36)
MCV RBC AUTO: 92 FL (ref 80–96)
MONOCYTES NFR BLD AUTO: 0.5 /CMM (ref 0.1–1.3)
MONOCYTES NFR BLD AUTO: 12.3 % (ref 2–12)
NEUTROPHILS # BLD AUTO: 2.8 /CMM (ref 1.8–8.9)
NEUTROPHILS NFR BLD AUTO: 68.4 % (ref 43–81)
PCO2 TEMP ADJ BLDA: 37.1 MMHG (ref 35–45)
PH TEMP ADJ BLDA: 7.43 [PH] (ref 7.35–7.45)
PLATELET # BLD AUTO: 211 /CMM (ref 150–450)
PO2 TEMP ADJ BLDA: 64.8 MMHG (ref 75–100)
RBC # BLD AUTO: 4.67 MIL/UL (ref 4.5–6)
SAO2 % BLDA: 93.1 % (ref 92–98.5)
WBC NRBC COR # BLD AUTO: 4.1 K/UL (ref 4.3–11)

## 2020-11-02 RX ADMIN — QUETIAPINE SCH MG: 25 TABLET, FILM COATED ORAL at 08:59

## 2020-11-02 RX ADMIN — TRIAMCINOLONE ACETONIDE SCH GM: 1 OINTMENT TOPICAL at 09:03

## 2020-11-02 RX ADMIN — DONEPEZIL HYDROCHLORIDE SCH MG: 5 TABLET ORAL at 21:38

## 2020-11-02 RX ADMIN — DOXYCYCLINE HYCLATE SCH MG: 100 TABLET, COATED ORAL at 09:00

## 2020-11-02 RX ADMIN — ATORVASTATIN CALCIUM SCH MG: 10 TABLET, FILM COATED ORAL at 21:38

## 2020-11-02 RX ADMIN — DOXYCYCLINE HYCLATE SCH MG: 100 TABLET, COATED ORAL at 21:38

## 2020-11-02 RX ADMIN — DIVALPROEX SODIUM SCH MG: 250 TABLET, DELAYED RELEASE ORAL at 09:00

## 2020-11-02 RX ADMIN — QUETIAPINE SCH MG: 25 TABLET, FILM COATED ORAL at 21:38

## 2020-11-02 RX ADMIN — Medication SCH OZ: at 09:04

## 2020-11-02 RX ADMIN — DIVALPROEX SODIUM SCH MG: 250 TABLET, DELAYED RELEASE ORAL at 21:38

## 2020-11-02 RX ADMIN — DEXTROSE MONOHYDRATE SCH MLS/HR: 50 INJECTION, SOLUTION INTRAVENOUS at 22:10

## 2020-11-02 RX ADMIN — ENOXAPARIN SODIUM SCH MG: 40 INJECTION SUBCUTANEOUS at 11:16

## 2020-11-02 NOTE — NUR
TELE RN NOTES



RECEIVED PATIENT IN BED, ASLEEP. AROUSABLE TO VERBAL AND TACTILE STIMULI. ALERT AND AWAKE 
ORIENTED X1. NO SOB. DENIES ANY C/O PAIN NOR DISCOMFORT AT THIS TIME. RIGHT HAND # 22 SL 
INTACT AND PATENT. BED IN LOWEST POSITION ,LOCKED. BED ALARM ON. CALL LIGHT WITHIN REACH. 
ABLE TO VERBALIZE NEEDS. FREQUENT VISUAL CHECK DONE.

## 2020-11-02 NOTE — NUR
MS RN OPENING NOTES



RECEIVED PATIENT IN BED ALERT AND ORIENTED X 1, LETHARGIC. BREATHING REGULAR AND UNLABORED 
ON OXYGEN AT 2L/MIN VIA NASAL CANNULA. RIGHT HAND G22 IV LINE INTACT AND PATENT, INFUSING 
WELL WITH NO BLEEDING OR S/S OF INFILTRATION OBSERVED. NO S/S OF PAIN/DISCOMFORT NOTED AT 
THIS TIME. BED LOW AND LOCKED ON SEMI FOWLERS POSITION. CALL LIGHT IN REACH. WILL CONTINUE 
TO MONITOR.

## 2020-11-02 NOTE — NUR
MS RN NOTES



PATIENT RESTING COMFORTABLY IN BED, ASLEEP. AROUSABLE TO VERBAL AND TACTILE STIMULI. ON O2 
AT 1-2L MIN VIA NC KIM WELL. DENIES ANY C/O PAIN NOR DISCOMFORT AT THIS TIME. RIGHT HAND # 
22 SL INTACT AND PATENT. BED IN LOWEST POSITION ,LOCKED. BED ALARM ON. CALL LIGHT WITHIN 
REACH. ABLE TO VERBALIZE NEEDS. FREQUENT VISUAL CHECK DONE. IN NO APPARENT DISTRESS.

## 2020-11-02 NOTE — NUR
MS RN CLOSE NOTES





PT IS LAYING IN BED. A/O X1, VERY LETHARGIC. ON 3L NASAL CANULA, NO SOB/ ACUTE RESPIRATORY 
DISTRESS NOTED. IV IN R HAND #22G IS PATENT AND INTACT. ALL DUE ANTIBIOTICS GIVEN. PT DENIES 
ANY PAIN AT THE MOMENT. BED IS IN LOWEST LOCKED POSITION WITH SIDE RAILS UP X3, SEMI 
FOWLERS. CALL LIGHT IS WITHIN REACH. WILL ENDORSE TO AM NURSE.

## 2020-11-03 VITALS — SYSTOLIC BLOOD PRESSURE: 137 MMHG | DIASTOLIC BLOOD PRESSURE: 69 MMHG

## 2020-11-03 VITALS — DIASTOLIC BLOOD PRESSURE: 67 MMHG | SYSTOLIC BLOOD PRESSURE: 133 MMHG

## 2020-11-03 LAB
BASOPHILS # BLD AUTO: 0 /CMM (ref 0–0.2)
BASOPHILS NFR BLD AUTO: 0.4 % (ref 0–2)
BUN SERPL-MCNC: 23 MG/DL (ref 7–18)
CALCIUM SERPL-MCNC: 8.5 MG/DL (ref 8.5–10.1)
CHLORIDE SERPL-SCNC: 108 MMOL/L (ref 98–107)
CO2 SERPL-SCNC: 28 MMOL/L (ref 21–32)
CREAT SERPL-MCNC: 0.8 MG/DL (ref 0.6–1.3)
EOSINOPHIL NFR BLD AUTO: 2.8 % (ref 0–6)
GLUCOSE SERPL-MCNC: 89 MG/DL (ref 74–106)
HCT VFR BLD AUTO: 42 % (ref 39–51)
HGB BLD-MCNC: 13.8 G/DL (ref 13.5–17.5)
LYMPHOCYTES NFR BLD AUTO: 0.7 /CMM (ref 0.8–4.8)
LYMPHOCYTES NFR BLD AUTO: 15.5 % (ref 20–44)
MCHC RBC AUTO-ENTMCNC: 33 G/DL (ref 31–36)
MCV RBC AUTO: 92 FL (ref 80–96)
MONOCYTES NFR BLD AUTO: 0.5 /CMM (ref 0.1–1.3)
MONOCYTES NFR BLD AUTO: 10.5 % (ref 2–12)
NEUTROPHILS # BLD AUTO: 3.1 /CMM (ref 1.8–8.9)
NEUTROPHILS NFR BLD AUTO: 70.8 % (ref 43–81)
PLATELET # BLD AUTO: 212 /CMM (ref 150–450)
POTASSIUM SERPL-SCNC: 4.1 MMOL/L (ref 3.5–5.1)
RBC # BLD AUTO: 4.56 MIL/UL (ref 4.5–6)
SODIUM SERPL-SCNC: 144 MMOL/L (ref 136–145)
WBC NRBC COR # BLD AUTO: 4.3 K/UL (ref 4.3–11)

## 2020-11-03 RX ADMIN — QUETIAPINE SCH MG: 25 TABLET, FILM COATED ORAL at 08:59

## 2020-11-03 RX ADMIN — DIVALPROEX SODIUM SCH MG: 250 TABLET, DELAYED RELEASE ORAL at 08:59

## 2020-11-03 RX ADMIN — ENOXAPARIN SODIUM SCH MG: 40 INJECTION SUBCUTANEOUS at 11:24

## 2020-11-03 RX ADMIN — DOXYCYCLINE HYCLATE SCH MG: 100 TABLET, COATED ORAL at 13:09

## 2020-11-03 RX ADMIN — Medication SCH OZ: at 11:23

## 2020-11-03 RX ADMIN — TRIAMCINOLONE ACETONIDE SCH GM: 1 OINTMENT TOPICAL at 13:10

## 2020-11-03 NOTE — NUR
ms rn

received on bed, lethargic,oriented x1,not in any form of distress, respirations even and 
unlabored,no sob noted,98% on 2 liters.will monitor patient's condition.

## 2020-11-03 NOTE — NUR
ms rn

patient transferred to [facility], report given to kari hadley,all needs attended.

## 2020-11-03 NOTE — NUR
MS RN CLOSING NOTES



PATIENT IN BED ALERT AND ORIENTED X 1, LETHARGIC. AFEBRILE WITH NO S/S OF DISTRESS OBSERVED. 
RIGHT HAND G22 IV LINE PATENT AND FLUSHING WELL. NO S/S OF PAIN/DISCOMFORT NOTED AT THIS 
TIME. BED LOW AND LOCKED ON SEMI FOWLERS POSITION. CALL LIGHT IN REACH. WILL ENDORSE TO 
MORNING SHIFT FOR IGOR.

## 2021-04-01 ENCOUNTER — HOSPITAL ENCOUNTER (INPATIENT)
Dept: HOSPITAL 12 - ER | Age: 66
LOS: 5 days | Discharge: NURSING, REHAB OR LONG-TERM CARE | DRG: 640 | End: 2021-04-06
Payer: MEDICARE

## 2021-04-01 VITALS — WEIGHT: 147.37 LBS | HEIGHT: 68 IN | BODY MASS INDEX: 22.34 KG/M2

## 2021-04-01 VITALS — DIASTOLIC BLOOD PRESSURE: 66 MMHG | SYSTOLIC BLOOD PRESSURE: 127 MMHG

## 2021-04-01 DIAGNOSIS — F20.9: ICD-10-CM

## 2021-04-01 DIAGNOSIS — F02.81: ICD-10-CM

## 2021-04-01 DIAGNOSIS — E78.5: ICD-10-CM

## 2021-04-01 DIAGNOSIS — L30.9: ICD-10-CM

## 2021-04-01 DIAGNOSIS — Z20.822: ICD-10-CM

## 2021-04-01 DIAGNOSIS — F39: ICD-10-CM

## 2021-04-01 DIAGNOSIS — R21: ICD-10-CM

## 2021-04-01 DIAGNOSIS — G92: ICD-10-CM

## 2021-04-01 DIAGNOSIS — F29: ICD-10-CM

## 2021-04-01 DIAGNOSIS — G30.9: ICD-10-CM

## 2021-04-01 DIAGNOSIS — R62.7: ICD-10-CM

## 2021-04-01 DIAGNOSIS — N17.0: ICD-10-CM

## 2021-04-01 DIAGNOSIS — E86.1: ICD-10-CM

## 2021-04-01 DIAGNOSIS — I10: ICD-10-CM

## 2021-04-01 DIAGNOSIS — E87.0: Primary | ICD-10-CM

## 2021-04-01 LAB
ALP SERPL-CCNC: 85 U/L (ref 50–136)
ALT SERPL W/O P-5'-P-CCNC: 19 U/L (ref 16–63)
AST SERPL-CCNC: 9 U/L (ref 15–37)
BASOPHILS # BLD AUTO: 0 K/UL (ref 0–8)
BASOPHILS NFR BLD AUTO: 0.4 % (ref 0–2)
BILIRUB DIRECT SERPL-MCNC: 0.1 MG/DL (ref 0–0.2)
BILIRUB SERPL-MCNC: 0.2 MG/DL (ref 0.2–1)
BUN SERPL-MCNC: 26 MG/DL (ref 7–18)
CHLORIDE SERPL-SCNC: 114 MMOL/L (ref 98–107)
CO2 SERPL-SCNC: 30 MMOL/L (ref 21–32)
CREAT SERPL-MCNC: 1.2 MG/DL (ref 0.6–1.3)
EOSINOPHIL # BLD AUTO: 0.6 K/UL (ref 0–0.7)
EOSINOPHIL NFR BLD AUTO: 5.8 % (ref 0–7)
GLUCOSE SERPL-MCNC: 91 MG/DL (ref 74–106)
HCT VFR BLD AUTO: 39.9 % (ref 36.7–47.1)
HGB BLD-MCNC: 13.3 G/DL (ref 12.5–16.3)
LIPASE SERPL-CCNC: 128 U/L (ref 73–393)
LYMPHOCYTES # BLD AUTO: 1.8 K/UL (ref 20–40)
LYMPHOCYTES NFR BLD AUTO: 18.8 % (ref 20.5–51.5)
MCH RBC QN AUTO: 30.1 UUG (ref 23.8–33.4)
MCHC RBC AUTO-ENTMCNC: 33 G/DL (ref 32.5–36.3)
MCV RBC AUTO: 90.3 FL (ref 73–96.2)
MONOCYTES # BLD AUTO: 1 K/UL (ref 2–10)
MONOCYTES NFR BLD AUTO: 10.8 % (ref 0–11)
NEUTROPHILS # BLD AUTO: 6.1 K/UL (ref 1.8–8.9)
NEUTROPHILS NFR BLD AUTO: 64.2 % (ref 38.5–71.5)
PLATELET # BLD AUTO: 308 K/UL (ref 152–348)
POTASSIUM SERPL-SCNC: 4.9 MMOL/L (ref 3.5–5.1)
RBC # BLD AUTO: 4.42 MIL/UL (ref 4.06–5.63)
WBC # BLD AUTO: 9.6 K/UL (ref 3.6–10.2)
WS STN SPEC: 7.3 G/DL (ref 6.4–8.2)

## 2021-04-01 PROCEDURE — A4663 DIALYSIS BLOOD PRESSURE CUFF: HCPCS

## 2021-04-01 PROCEDURE — G0378 HOSPITAL OBSERVATION PER HR: HCPCS

## 2021-04-01 PROCEDURE — U0003 INFECTIOUS AGENT DETECTION BY NUCLEIC ACID (DNA OR RNA); SEVERE ACUTE RESPIRATORY SYNDROME CORONAVIRUS 2 (SARS-COV-2) (CORONAVIRUS DISEASE [COVID-19]), AMPLIFIED PROBE TECHNIQUE, MAKING USE OF HIGH THROUGHPUT TECHNOLOGIES AS DESCRIBED BY CMS-2020-01-R: HCPCS

## 2021-04-01 RX ADMIN — ATORVASTATIN CALCIUM SCH MG: 10 TABLET, FILM COATED ORAL at 22:55

## 2021-04-01 RX ADMIN — SODIUM CHLORIDE PRN MLS/HR: 0.45 INJECTION, SOLUTION INTRAVENOUS at 22:57

## 2021-04-01 RX ADMIN — DONEPEZIL HYDROCHLORIDE SCH MG: 5 TABLET, FILM COATED ORAL at 22:55

## 2021-04-01 NOTE — NUR
PATIENT HAS A LOT  SCATCHES  ON ARMS AND LEGS  DUE TO PATIENT  CONSTANT 
SCATCHING .  UNABLE TO INSERT AND COLLECT URINE   PATIENT IS  COMBATIVE

## 2021-04-01 NOTE — NUR
Notified Dr. Dover of patient scratching and body rash appearance. New orders for 
Benadryl 25mg PO Q6hrs PRN. Will administer medication per order and continue to monitor.

## 2021-04-01 NOTE — NUR
Received patient from the Emergency Department admitted by Dr. Dover for failure to 
thrive. Patient is drowsy, able to open eyes and responds to light touch. Patient is 
confused. No s/s of acute distress at this time. Pt on RA, no s/s of SOB. Patient noted to 
have small pin point rash and scabs throughout body and noted to be scratching all over, 
will notify MD. Safety measures in place, bed locked and in low position. Isolation 
precautions in place until further notice.

## 2021-04-01 NOTE — NUR
report given to thomas , hx   medications given and   status of the patient  , 
patient is  arousable , combative  , unable to  insert payne catheter ,   floor 
nurse aware

## 2021-04-02 VITALS — DIASTOLIC BLOOD PRESSURE: 66 MMHG | SYSTOLIC BLOOD PRESSURE: 129 MMHG

## 2021-04-02 VITALS — DIASTOLIC BLOOD PRESSURE: 49 MMHG | SYSTOLIC BLOOD PRESSURE: 102 MMHG

## 2021-04-02 VITALS — DIASTOLIC BLOOD PRESSURE: 58 MMHG | SYSTOLIC BLOOD PRESSURE: 116 MMHG

## 2021-04-02 VITALS — DIASTOLIC BLOOD PRESSURE: 66 MMHG | SYSTOLIC BLOOD PRESSURE: 125 MMHG

## 2021-04-02 LAB
BASOPHILS # BLD AUTO: 0 K/UL (ref 0–8)
BASOPHILS NFR BLD AUTO: 0.3 % (ref 0–2)
BUN SERPL-MCNC: 22 MG/DL (ref 7–18)
CHLORIDE SERPL-SCNC: 114 MMOL/L (ref 98–107)
CO2 SERPL-SCNC: 26 MMOL/L (ref 21–32)
CREAT SERPL-MCNC: 0.9 MG/DL (ref 0.6–1.3)
EOSINOPHIL # BLD AUTO: 0.8 K/UL (ref 0–0.7)
EOSINOPHIL NFR BLD AUTO: 8.1 % (ref 0–7)
GLUCOSE SERPL-MCNC: 94 MG/DL (ref 74–106)
HCT VFR BLD AUTO: 37.4 % (ref 36.7–47.1)
HGB BLD-MCNC: 12.4 G/DL (ref 12.5–16.3)
LYMPHOCYTES # BLD AUTO: 1.8 K/UL (ref 20–40)
LYMPHOCYTES NFR BLD AUTO: 19.1 % (ref 20.5–51.5)
MAGNESIUM SERPL-MCNC: 2.2 MG/DL (ref 1.8–2.4)
MCH RBC QN AUTO: 30.1 UUG (ref 23.8–33.4)
MCHC RBC AUTO-ENTMCNC: 33 G/DL (ref 32.5–36.3)
MCV RBC AUTO: 90.6 FL (ref 73–96.2)
MONOCYTES # BLD AUTO: 0.8 K/UL (ref 2–10)
MONOCYTES NFR BLD AUTO: 9.1 % (ref 0–11)
NEUTROPHILS # BLD AUTO: 5.9 K/UL (ref 1.8–8.9)
NEUTROPHILS NFR BLD AUTO: 63.4 % (ref 38.5–71.5)
PHOSPHATE SERPL-MCNC: 3.5 MG/DL (ref 2.5–4.9)
PLATELET # BLD AUTO: 258 K/UL (ref 152–348)
POTASSIUM SERPL-SCNC: 3.5 MMOL/L (ref 3.5–5.1)
RBC # BLD AUTO: 4.13 MIL/UL (ref 4.06–5.63)
WBC # BLD AUTO: 9.2 K/UL (ref 3.6–10.2)

## 2021-04-02 RX ADMIN — DIVALPROEX SODIUM SCH MG: 250 TABLET, DELAYED RELEASE ORAL at 08:16

## 2021-04-02 RX ADMIN — DONEPEZIL HYDROCHLORIDE SCH MG: 5 TABLET, FILM COATED ORAL at 22:27

## 2021-04-02 RX ADMIN — HYDROCODONE BITARTRATE AND ACETAMINOPHEN PRN TAB: 5; 325 TABLET ORAL at 12:53

## 2021-04-02 RX ADMIN — OXYCODONE HYDROCHLORIDE AND ACETAMINOPHEN SCH MG: 500 TABLET ORAL at 16:27

## 2021-04-02 RX ADMIN — QUETIAPINE SCH MG: 25 TABLET, FILM COATED ORAL at 16:27

## 2021-04-02 RX ADMIN — QUETIAPINE SCH MG: 25 TABLET, FILM COATED ORAL at 08:16

## 2021-04-02 RX ADMIN — ATORVASTATIN CALCIUM SCH MG: 10 TABLET, FILM COATED ORAL at 22:28

## 2021-04-02 RX ADMIN — OXYCODONE HYDROCHLORIDE AND ACETAMINOPHEN SCH MG: 500 TABLET ORAL at 08:16

## 2021-04-02 RX ADMIN — MELATONIN TAB 3 MG SCH MG: 3 TAB at 22:27

## 2021-04-02 RX ADMIN — SODIUM CHLORIDE PRN MLS/HR: 0.45 INJECTION, SOLUTION INTRAVENOUS at 12:52

## 2021-04-02 RX ADMIN — DIVALPROEX SODIUM SCH MG: 250 TABLET, DELAYED RELEASE ORAL at 16:27

## 2021-04-02 NOTE — NUR
PATIENT RECEIVED WITH EYES CLOSED IN BED. PATIENT IS EASILY AROUSABLE TO NAME AND LIGHT 
TOUCH. PINPOINT SCABS NOTED THROUGHOUT THE BODY. PATIENT IS NOT SCRATCHING AT THIS TIME. 
PATIENT'S EXTREMITIES ARE RIGID, BUT NOT CONTRACTED. PATIENT'S IV IN THE LEFT FOREARM IS 
PATENT WITH 1/2 NS RUNNING AT 75ML/H AS ORDERED. NO REDNESS OR SWELLING NOTED. CALL LIGHT 
WITHIN EASY REACH.

## 2021-04-02 NOTE — NUR
PATIENT NOTED TO BE SCRATCHING AND GIVEN BENADRYL AS ORDERED. MD NOTIFIED WITH NO NEW ORDERS 
AT THIS TIME. CONTINUES TO BE CONFUSED AND ANSWERS INTERMITTENTLY WHEN ASKED QUESTIONS. 
PATIENT VERY RESTLESS AND IS GRASPING SIDES AND LEGS. GIVEN HYDROCODONE AS ORDERED. WILL 
CONTINUE TO PROVIDE REDIRECTION AND CONTINUE TO MONITOR. CALL LIGHT WITHIN EASY REACH.

## 2021-04-02 NOTE — NUR
ELIMITE CREAM AND IVERMECTIN TABLET NOT AVAILABLE. DR. PEREZ AND PHARMACY CONCURRED 
WITH EACH OTHER TO WAIT UNTIL RESULT OF SCRAPING IS BACK.

## 2021-04-02 NOTE — NUR
PATIENT RESTING IN BED WITH EYES CLOSED. IS NOT ITCHING AT THIS TIME AND IS SHOWING NO SIGNS 
OF DISCOMFORTS. WILL CONTINUE TO MONITOR.

## 2021-04-02 NOTE — NUR
RECEIVED PT IN BED, RESTING, AROUSABLE TO LIGHT TOUCH. ABLE TO FOLLOW SIMPLE COMMANDS. NO 
S/S OF RESPIRATORY DISTRESS, NO PAIN OR DISCOMFORT NOTED. PT ON CONTACT ISOLATION. SAFETY 
MEASURES INITIATED, WILL CONTINUE TO MONITOR.

## 2021-04-02 NOTE — NUR
WOUND CARE CONSULT:  PT NOTED TO BE SCRATCHING HIS SKIN AND NOTED TO HAVE MULTIPLE PINPOINT 
SCABS AND SCRATCH MARKS ALL OVER AS WELL AS LESIONS BETWEEN FINGERS, PRESENT ON ADMISSION. 
DR PLASENCIA NOTIFIED OF ABOVE ASSESSMENT. PT IS INCONTINENT. DISCUSSED SKIN PROTECTION 
WITH NURSING STAFF AND CHARGE RN. MD IN AGREEMENT WITH PLAN OF CARE. 

-------------------------------------------------------------------------------

Addendum: 04/02/21 at 1253 by FREDRICK HOFFMAN RN

-------------------------------------------------------------------------------

CORRECTION: DR PEREZ WAS NOTIFIED OF PT SKIN ASSESSMENT FINDINGS.

## 2021-04-03 VITALS — DIASTOLIC BLOOD PRESSURE: 68 MMHG | SYSTOLIC BLOOD PRESSURE: 106 MMHG

## 2021-04-03 VITALS — DIASTOLIC BLOOD PRESSURE: 50 MMHG | SYSTOLIC BLOOD PRESSURE: 95 MMHG

## 2021-04-03 VITALS — SYSTOLIC BLOOD PRESSURE: 124 MMHG | DIASTOLIC BLOOD PRESSURE: 66 MMHG

## 2021-04-03 VITALS — SYSTOLIC BLOOD PRESSURE: 104 MMHG | DIASTOLIC BLOOD PRESSURE: 52 MMHG

## 2021-04-03 VITALS — SYSTOLIC BLOOD PRESSURE: 167 MMHG | DIASTOLIC BLOOD PRESSURE: 87 MMHG

## 2021-04-03 RX ADMIN — HYDROCODONE BITARTRATE AND ACETAMINOPHEN PRN TAB: 5; 325 TABLET ORAL at 15:05

## 2021-04-03 RX ADMIN — Medication SCH ML: at 17:35

## 2021-04-03 RX ADMIN — OXYCODONE HYDROCHLORIDE AND ACETAMINOPHEN SCH MG: 500 TABLET ORAL at 08:03

## 2021-04-03 RX ADMIN — DIVALPROEX SODIUM SCH MG: 250 TABLET, DELAYED RELEASE ORAL at 08:03

## 2021-04-03 RX ADMIN — ATORVASTATIN CALCIUM SCH MG: 10 TABLET, FILM COATED ORAL at 20:48

## 2021-04-03 RX ADMIN — OXYCODONE HYDROCHLORIDE AND ACETAMINOPHEN SCH MG: 500 TABLET ORAL at 17:09

## 2021-04-03 RX ADMIN — SODIUM CHLORIDE PRN MLS/HR: 0.45 INJECTION, SOLUTION INTRAVENOUS at 03:00

## 2021-04-03 RX ADMIN — DONEPEZIL HYDROCHLORIDE SCH MG: 5 TABLET, FILM COATED ORAL at 20:48

## 2021-04-03 RX ADMIN — QUETIAPINE SCH MG: 25 TABLET, FILM COATED ORAL at 08:03

## 2021-04-03 RX ADMIN — DIVALPROEX SODIUM SCH MG: 250 TABLET, DELAYED RELEASE ORAL at 17:09

## 2021-04-03 RX ADMIN — SODIUM CHLORIDE PRN MLS/HR: 0.45 INJECTION, SOLUTION INTRAVENOUS at 17:09

## 2021-04-03 RX ADMIN — QUETIAPINE SCH MG: 25 TABLET, FILM COATED ORAL at 17:09

## 2021-04-03 RX ADMIN — MELATONIN TAB 3 MG SCH MG: 3 TAB at 20:48

## 2021-04-03 NOTE — NUR
PATIENT RECEIVED IN BED WITH EYES OPEN. UPON CHECKING, PATIENT HAS PULLED HIS IV OUT AND A 
NEW IV 20G WAS PLACED ON THE LEFT HAND AND REINFORCED SO THAT PATIENT WILL NOT PULL IT OUT. 
IVF 1/2 NS NOW RUNNING AT 75 ML/H AS ORDERED. RE-POSITIONED IN BED AND MADE COMFORTABLE WITH 
NO ITCHING/SCRATCHING AT THIS TIME. CALL LIGHT IS WITHIN EASY REACH. WILL CONTINUE TO 
OBSERVE.

## 2021-04-03 NOTE — NUR
Received pt in bed, awake and able to follow simple commands, cooperative with care although 
requires frequent redirection. No s/s of respiratory distress, no pain or discomfort noted. 
IVF infusing well on left hand, wrapped with kerlix. Repositioned in bed comfortably, safety 
measures initiated, call light within reach, will continue to monitor.

## 2021-04-03 NOTE — NUR
PATIENT IS RESTING IN BED WITH EYES CLOSED. IVF RUNNING AS ORDERED. PATIENT CONTINUES TO BE 
CONFUSED AND REQUIRES REDIRECTION.

## 2021-04-03 NOTE — NUR
PT IN BED, AWAKE. REQUIRES FREQUENT ORIENTATION BUT ABLE TO FOLLOW SIMPLE COMMANDS. NO S/S 
OF RESPIRATORY DISTRESS, NO DISCOMFORT OR PAIN NOTED. IVF INFUSING WELL. POSITIONED 
COMFORTABLY IN BED. SAFETY MEASURES MAINTAINED AT ALL TIMES. ALL NEEDS ATTENDED.

## 2021-04-04 VITALS — DIASTOLIC BLOOD PRESSURE: 50 MMHG | SYSTOLIC BLOOD PRESSURE: 103 MMHG

## 2021-04-04 VITALS — DIASTOLIC BLOOD PRESSURE: 54 MMHG | SYSTOLIC BLOOD PRESSURE: 99 MMHG

## 2021-04-04 VITALS — DIASTOLIC BLOOD PRESSURE: 51 MMHG | SYSTOLIC BLOOD PRESSURE: 117 MMHG

## 2021-04-04 VITALS — DIASTOLIC BLOOD PRESSURE: 58 MMHG | SYSTOLIC BLOOD PRESSURE: 134 MMHG

## 2021-04-04 RX ADMIN — QUETIAPINE SCH MG: 25 TABLET, FILM COATED ORAL at 16:17

## 2021-04-04 RX ADMIN — SODIUM CHLORIDE PRN MLS/HR: 0.45 INJECTION, SOLUTION INTRAVENOUS at 21:04

## 2021-04-04 RX ADMIN — Medication SCH ML: at 17:28

## 2021-04-04 RX ADMIN — DONEPEZIL HYDROCHLORIDE SCH MG: 5 TABLET, FILM COATED ORAL at 21:17

## 2021-04-04 RX ADMIN — ATORVASTATIN CALCIUM SCH MG: 10 TABLET, FILM COATED ORAL at 21:17

## 2021-04-04 RX ADMIN — DIVALPROEX SODIUM SCH MG: 250 TABLET, DELAYED RELEASE ORAL at 16:17

## 2021-04-04 RX ADMIN — SODIUM CHLORIDE PRN MLS/HR: 0.45 INJECTION, SOLUTION INTRAVENOUS at 08:36

## 2021-04-04 RX ADMIN — OXYCODONE HYDROCHLORIDE AND ACETAMINOPHEN SCH MG: 500 TABLET ORAL at 16:18

## 2021-04-04 RX ADMIN — QUETIAPINE SCH MG: 25 TABLET, FILM COATED ORAL at 08:35

## 2021-04-04 RX ADMIN — OXYCODONE HYDROCHLORIDE AND ACETAMINOPHEN SCH MG: 500 TABLET ORAL at 08:35

## 2021-04-04 RX ADMIN — Medication SCH ML: at 08:35

## 2021-04-04 RX ADMIN — DIVALPROEX SODIUM SCH MG: 250 TABLET, DELAYED RELEASE ORAL at 09:36

## 2021-04-04 RX ADMIN — MELATONIN TAB 3 MG SCH MG: 3 TAB at 21:17

## 2021-04-04 NOTE — NUR
Received pt in bed, resting, able to follow simple commands. No s/s of respiratory distress, 
no pain or discomfort noted. IVF infusing well on left hand. Repositioned in bed 
comfortably, safety measures initiated, call light within reach, will continue to monitor.

## 2021-04-04 NOTE — NUR
Patient AOx1 but obeys command. on room air. no signs of acute distress. compliant with 
medication and care. safety and comfort measures provided. bed locked and in low position. 
will endorse to incoming shift.

## 2021-04-04 NOTE — NUR
Pt in bed, awake, cooperative with care, able to follow simple commands but requires 
frequent redirection. No s/s of respiratory distress, no pain or discomfort noted. IVF 
infusing well. Safety measures maintained at all times, call light within reach, all needs 
attended.

## 2021-04-05 VITALS — DIASTOLIC BLOOD PRESSURE: 52 MMHG | SYSTOLIC BLOOD PRESSURE: 124 MMHG

## 2021-04-05 VITALS — SYSTOLIC BLOOD PRESSURE: 124 MMHG | DIASTOLIC BLOOD PRESSURE: 63 MMHG

## 2021-04-05 VITALS — DIASTOLIC BLOOD PRESSURE: 78 MMHG | SYSTOLIC BLOOD PRESSURE: 134 MMHG

## 2021-04-05 VITALS — SYSTOLIC BLOOD PRESSURE: 120 MMHG | DIASTOLIC BLOOD PRESSURE: 57 MMHG

## 2021-04-05 VITALS — DIASTOLIC BLOOD PRESSURE: 49 MMHG | SYSTOLIC BLOOD PRESSURE: 111 MMHG

## 2021-04-05 LAB
BASOPHILS # BLD AUTO: 0 K/UL (ref 0–8)
BASOPHILS NFR BLD AUTO: 0.5 % (ref 0–2)
BUN SERPL-MCNC: 13 MG/DL (ref 7–18)
CHLORIDE SERPL-SCNC: 105 MMOL/L (ref 98–107)
CO2 SERPL-SCNC: 29 MMOL/L (ref 21–32)
CREAT SERPL-MCNC: 1 MG/DL (ref 0.6–1.3)
EOSINOPHIL # BLD AUTO: 0.6 K/UL (ref 0–0.7)
EOSINOPHIL NFR BLD AUTO: 8.2 % (ref 0–7)
GLUCOSE SERPL-MCNC: 91 MG/DL (ref 74–106)
HCT VFR BLD AUTO: 39.1 % (ref 36.7–47.1)
HGB BLD-MCNC: 13.2 G/DL (ref 12.5–16.3)
LYMPHOCYTES # BLD AUTO: 1.5 K/UL (ref 20–40)
LYMPHOCYTES NFR BLD AUTO: 20 % (ref 20.5–51.5)
MCH RBC QN AUTO: 30.3 UUG (ref 23.8–33.4)
MCHC RBC AUTO-ENTMCNC: 34 G/DL (ref 32.5–36.3)
MCV RBC AUTO: 90.1 FL (ref 73–96.2)
MONOCYTES # BLD AUTO: 0.7 K/UL (ref 2–10)
MONOCYTES NFR BLD AUTO: 10.1 % (ref 0–11)
NEUTROPHILS # BLD AUTO: 4.4 K/UL (ref 1.8–8.9)
NEUTROPHILS NFR BLD AUTO: 61.2 % (ref 38.5–71.5)
PLATELET # BLD AUTO: 282 K/UL (ref 152–348)
POTASSIUM SERPL-SCNC: 4.5 MMOL/L (ref 3.5–5.1)
RBC # BLD AUTO: 4.34 MIL/UL (ref 4.06–5.63)
WBC # BLD AUTO: 7.3 K/UL (ref 3.6–10.2)

## 2021-04-05 RX ADMIN — ATORVASTATIN CALCIUM SCH MG: 10 TABLET, FILM COATED ORAL at 21:22

## 2021-04-05 RX ADMIN — DONEPEZIL HYDROCHLORIDE SCH MG: 5 TABLET, FILM COATED ORAL at 21:22

## 2021-04-05 RX ADMIN — QUETIAPINE SCH MG: 25 TABLET, FILM COATED ORAL at 16:58

## 2021-04-05 RX ADMIN — QUETIAPINE SCH MG: 25 TABLET, FILM COATED ORAL at 09:01

## 2021-04-05 RX ADMIN — MELATONIN TAB 3 MG SCH MG: 3 TAB at 21:22

## 2021-04-05 RX ADMIN — OXYCODONE HYDROCHLORIDE AND ACETAMINOPHEN SCH MG: 500 TABLET ORAL at 16:58

## 2021-04-05 RX ADMIN — OXYCODONE HYDROCHLORIDE AND ACETAMINOPHEN SCH MG: 500 TABLET ORAL at 09:02

## 2021-04-05 RX ADMIN — Medication SCH ML: at 09:02

## 2021-04-05 RX ADMIN — SODIUM CHLORIDE PRN MLS/HR: 0.45 INJECTION, SOLUTION INTRAVENOUS at 15:46

## 2021-04-05 RX ADMIN — DIVALPROEX SODIUM SCH MG: 250 TABLET, DELAYED RELEASE ORAL at 09:02

## 2021-04-05 RX ADMIN — DIVALPROEX SODIUM SCH MG: 250 TABLET, DELAYED RELEASE ORAL at 16:58

## 2021-04-05 RX ADMIN — Medication SCH ML: at 16:58

## 2021-04-05 NOTE — NUR
RECEIVED PT IN NO ACUTE DISTRESS. IV INTACT. PT ALERT , AWAKE AND ORIENTEDX1. SAFETY AND 
COMFORT PROVIDED. WILL CONTINUE TO MONITOR.

## 2021-04-05 NOTE — NUR
CALLED  TO CLARIFY IF PT WILL BE DISCHARGE. DR. PEREZ SAID PT CAN BE DISCHARGE 
TONIGHT.  TOLD  THAT NEEDS  TO TRANSFER PT SO DISCHARGE WILL BE TOMORROW.

## 2021-04-05 NOTE — NUR
Pt in bed, awake, cooperative with care, able to follow simple commands  No s/s of 
respiratory distress, no pain or discomfort noted. IVF infusing well on left forearm. Safety 
measures maintained at all times, call light within reach, all needs attended. Psych consult 
today.

## 2021-04-05 NOTE — NUR
EOSR

Pt in bed resting. Pt is on room air saturating at 98%, A&Ox 1 but is cooperative with care 
and follows command. Meds given as ordered and tolerated well. safety measures in place call 
light in reach bed in lowest position, Will endorse to oncoming nurse

## 2021-04-05 NOTE — NUR
Pt in bed resting. Pt is on room air saturating at 96%, AOx 1 but is cooperative with care 
and follows command. safety measures in place call light in reach will continue to monitor.

## 2021-04-06 VITALS — SYSTOLIC BLOOD PRESSURE: 108 MMHG | DIASTOLIC BLOOD PRESSURE: 55 MMHG

## 2021-04-06 VITALS — DIASTOLIC BLOOD PRESSURE: 43 MMHG | SYSTOLIC BLOOD PRESSURE: 108 MMHG

## 2021-04-06 RX ADMIN — QUETIAPINE SCH MG: 25 TABLET, FILM COATED ORAL at 08:50

## 2021-04-06 RX ADMIN — OXYCODONE HYDROCHLORIDE AND ACETAMINOPHEN SCH MG: 500 TABLET ORAL at 08:50

## 2021-04-06 RX ADMIN — DIVALPROEX SODIUM SCH MG: 250 TABLET, DELAYED RELEASE ORAL at 08:50

## 2021-04-06 RX ADMIN — Medication SCH ML: at 08:51

## 2021-04-06 NOTE — NUR
Patient discharge to [facility].  All belongings given and all paper sent with 
patient to facility.  ID band removed.  Pictures documented. All medications given as 
ordered.

## 2021-04-06 NOTE — NUR
Patient pulled out IV line.  Plan is to discharge patient today.  Will not restart line at 
this time.  Will continue to monitor.

## 2021-04-06 NOTE — NUR
PT SLEPT INTERMITTENTLY. T[facility] WITH  [facility] AND 
 IN THE MORNING WILL ASSIST IN DISCHARGING THE PT. PT GIVEN PRN MEDICATIONS 
ATIVAN AT 2319H FOR RESTLESNESS AND TYLENOL AT 2122H.PT TOLERATED IT WELL. PT IN NO ACUTE 
DISTRESS. IV INTACT. PRESCRIBED MEDICATION GIVEN AND PT TOLERATED IT WELL. SAFETY AND 
COMFORT PROVIDED. WILL ENDORSE TO INCOMING NURSE FOR CONTINUITY OF CARE.

## 2021-04-06 NOTE — NUR
Patient received in bed awake alert and oriented times 1.  No sign of distress noted.  
Patient is on room air.  No longer on isolated, test came back negative for scabies.  Safety 
precautions are in place.  Will continue to monitor.

## 2023-05-19 NOTE — NUR
MS RN CLOSING NOTE



PATIENT IN BED RESTING COMFORTABLY. PATIENT IN NO ACUTE DISTRESS. NO SOB NOTED. PATIENT 
BREATHING IS EVEN AND UNLABORED. PATIENT AROUSABLE TO PAINFUL STIMULI. PATIENT SAFETY 
PRECAUTIONS IN PLACE. PATIENT KEPT CLEAN, DRY, AND COMFORTABLE THROUGHOUT SHIFT. HOB IS 
ELEVATED. BED ALARM IS ON. MAINTAINED ISOLATION PRECAUTIONS. PATIENT BED IS LOCKED AND IN 
LOWEST POSITION. CALL LIGHT WITHIN REACH. WILL ENDORSE CARE TO PM SHIFT FOR IGOR. Attending Attestation (For Attendings USE Only)...